# Patient Record
Sex: FEMALE | Race: WHITE | NOT HISPANIC OR LATINO | Employment: FULL TIME | URBAN - METROPOLITAN AREA
[De-identification: names, ages, dates, MRNs, and addresses within clinical notes are randomized per-mention and may not be internally consistent; named-entity substitution may affect disease eponyms.]

---

## 2021-06-28 ENCOUNTER — HOSPITAL ENCOUNTER (EMERGENCY)
Facility: HOSPITAL | Age: 56
Discharge: HOME | End: 2021-06-28
Admitting: EMERGENCY MEDICINE

## 2021-06-28 ENCOUNTER — APPOINTMENT (OUTPATIENT)
Dept: GENERAL RADIOLOGY | Facility: HOSPITAL | Age: 56
End: 2021-06-28

## 2021-06-28 VITALS
WEIGHT: 132.28 LBS | BODY MASS INDEX: 23.44 KG/M2 | DIASTOLIC BLOOD PRESSURE: 91 MMHG | HEART RATE: 68 BPM | HEIGHT: 63 IN | RESPIRATION RATE: 16 BRPM | SYSTOLIC BLOOD PRESSURE: 130 MMHG | OXYGEN SATURATION: 98 % | TEMPERATURE: 98 F

## 2021-06-28 DIAGNOSIS — G56.01 CARPAL TUNNEL SYNDROME OF RIGHT WRIST: ICD-10-CM

## 2021-06-28 DIAGNOSIS — M79.641 PAIN OF RIGHT HAND: Primary | ICD-10-CM

## 2021-06-28 DIAGNOSIS — M25.531 RIGHT WRIST PAIN: ICD-10-CM

## 2021-06-28 PROCEDURE — 73110 X-RAY EXAM OF WRIST: CPT

## 2021-06-28 PROCEDURE — 99283 EMERGENCY DEPT VISIT LOW MDM: CPT

## 2021-06-28 PROCEDURE — 73130 X-RAY EXAM OF HAND: CPT

## 2021-06-28 RX ORDER — HYDROCODONE BITARTRATE AND ACETAMINOPHEN 5; 325 MG/1; MG/1
1 TABLET ORAL EVERY 6 HOURS PRN
Qty: 12 TABLET | Refills: 0 | OUTPATIENT
Start: 2021-06-28 | End: 2022-03-07

## 2021-06-28 RX ORDER — TRAMADOL HYDROCHLORIDE 50 MG/1
50 TABLET ORAL EVERY 6 HOURS PRN
Qty: 12 TABLET | Refills: 0 | Status: SHIPPED | OUTPATIENT
Start: 2021-06-28 | End: 2021-06-28

## 2021-06-28 RX ORDER — HYDROCODONE BITARTRATE AND ACETAMINOPHEN 5; 325 MG/1; MG/1
1 TABLET ORAL ONCE AS NEEDED
Status: COMPLETED | OUTPATIENT
Start: 2021-06-28 | End: 2021-06-28

## 2021-06-28 RX ORDER — METHYLPREDNISOLONE 4 MG/1
TABLET ORAL
Qty: 21 TABLET | Refills: 0 | OUTPATIENT
Start: 2021-06-28 | End: 2022-03-07

## 2021-06-28 RX ADMIN — HYDROCODONE BITARTRATE AND ACETAMINOPHEN 1 TABLET: 5; 325 TABLET ORAL at 10:15

## 2022-03-07 ENCOUNTER — HOSPITAL ENCOUNTER (EMERGENCY)
Facility: HOSPITAL | Age: 57
Discharge: HOME | End: 2022-03-07
Attending: EMERGENCY MEDICINE | Admitting: EMERGENCY MEDICINE

## 2022-03-07 VITALS
HEIGHT: 63 IN | RESPIRATION RATE: 18 BRPM | SYSTOLIC BLOOD PRESSURE: 151 MMHG | BODY MASS INDEX: 24.14 KG/M2 | DIASTOLIC BLOOD PRESSURE: 86 MMHG | TEMPERATURE: 97.4 F | WEIGHT: 136.24 LBS | HEART RATE: 80 BPM | OXYGEN SATURATION: 99 %

## 2022-03-07 DIAGNOSIS — M25.532 BILATERAL WRIST PAIN: Primary | ICD-10-CM

## 2022-03-07 DIAGNOSIS — M25.531 BILATERAL WRIST PAIN: Primary | ICD-10-CM

## 2022-03-07 PROCEDURE — 99282 EMERGENCY DEPT VISIT SF MDM: CPT

## 2022-03-07 RX ORDER — METHYLPREDNISOLONE 4 MG/1
TABLET ORAL
Qty: 21 TABLET | Refills: 0 | Status: SHIPPED | OUTPATIENT
Start: 2022-03-07 | End: 2022-06-02

## 2022-03-07 RX ORDER — MELOXICAM 7.5 MG/1
7.5 TABLET ORAL DAILY
Qty: 21 TABLET | Refills: 0 | Status: SHIPPED | OUTPATIENT
Start: 2022-03-07 | End: 2022-06-02

## 2022-03-07 NOTE — ED PROVIDER NOTES
Subjective   Chief complaint: Bilateral wrist pain      Context: Patient is a 56-year-old female who comes in ambulatory by private vehicle by herself with complaints of bilateral wrist pain and left elbow pain.  She states that she was seen last year for the same complaints and thought she had carpal tunnel but never followed up with anyone.  She states that she does work as a  with repetitive motion.  She is right-hand dominant and states she does have some occasional pain in her right elbow.  She denies any fever or systemic illness.  She has not been using any over-the-counter medications alleviate her symptoms.  She notes occasional weakness and paresthesias depending on range of motion and how much she is working without any other unilateral focal deficits confusion ataxia or lethargy.    Location: Bilateral arms  Duration: Greater than 6 months  Timing: Waxes and wanes  Quality/Severity: Moderate   modifying factors: Worse with range of motion  Associated symptoms:        Additional hx provided by: Self    PCP:    None               Review of Systems   Constitutional: Negative for fever.   HENT: Negative.    Eyes: Negative.    Respiratory: Negative.    Cardiovascular: Negative.    Gastrointestinal: Negative.    Genitourinary: Negative.    Musculoskeletal: Positive for arthralgias.   Skin: Negative.    Allergic/Immunologic: Negative for immunocompromised state.   Neurological: Positive for weakness and numbness.   Hematological: Does not bruise/bleed easily.   Psychiatric/Behavioral: Negative for confusion.       No past medical history on file.    Allergies   Allergen Reactions   • Tramadol Nausea Only       No past surgical history on file.    No family history on file.    Social History     Socioeconomic History   • Marital status: Single           Objective   Physical Exam     Vital signs and triage nurse note reviewed.   Constitutional: Awake, alert; well-developed and well-nourished. No acute  distress is noted.   HEENT: Normocephalic, atraumatic; pupils are PERRL with intact EOM; oropharynx is pink and moist without exudate or erythema.   Neck: Supple, full range of motion without pain;     Cardiovascular: Regular rate and rhythm, normal S1-S2.   Pulmonary: Respiratory effort regular nonlabored,g.   Musculoskeletal: Independent range of motion of all extremities; +3 radial pulse.  There is no swelling or cellulitic changes.  Positive Phalen bilaterally.   Neuro: Alert oriented x3, speech is clear and appropriate, GCS 15   Skin:  Fleshtone warm, dry, intact; no erythematous or petechial rash or lesion       Procedures           ED Course      Labs Reviewed - No data to display  Medications - No data to display  No radiology results for the last day  Prior to Admission medications    Medication Sig Start Date End Date Taking? Authorizing Provider   meloxicam (MOBIC) 7.5 MG tablet Take 1 tablet by mouth Daily. 3/7/22   Kera Simmons APRSHY   methylPREDNISolone (MEDROL) 4 MG dose pack Take as directed on package instructions. 3/7/22   Kera Simmons, LORENZO   HYDROcodone-acetaminophen (NORCO) 5-325 MG per tablet Take 1 tablet by mouth Every 6 (Six) Hours As Needed for Moderate Pain . 6/28/21 3/7/22  Abi Brumfield PA   methylPREDNISolone (MEDROL) 4 MG dose pack Take as directed on package instructions. 6/28/21 3/7/22  Abi Brumfield PA                                                MDM  Number of Diagnoses or Management Options  Bilateral wrist pain  Diagnosis management comments: Chart review: July 2021: Right arm pain likely attributed to carpal tunnel, negative x-rays prescriptions for response Medrol and Norco      Comorbidities: Denies  Differentials: Carpal tunnel cubital tunnel arthritis not all inclusive of differentials considered  Radiology interpretation: Not emergently warranted.  No injury.  Denies any history of chemoradiation osteoporosis IV drug use or chronic steroid  use.  Lab interpretation: No cellulitic changes on physical exam consistent with septic arthritis or systemic infection    Appropriate PPE worn during exam.    i discussed findings with patient who voices understanding of discharge instructions, signs and symptoms requiring return to ED; discharged improved and in stable condition with follow up for re-evaluation.  Patient she has bilateral splints at home, will be discharged home with Medrol and Mobic      Final diagnoses:   Bilateral wrist pain       ED Disposition  ED Disposition     ED Disposition   Discharge    Condition   Stable    Comment   --             KLEINERT KUTZ [facility] - [address] [address]  [phone]  Schedule an appointment as soon as possible for a visit            Medication List      New Prescriptions    meloxicam 7.5 MG tablet  Commonly known as: MOBIC  Take 1 tablet by mouth Daily.        Stop    HYDROcodone-acetaminophen 5-325 MG per tablet  Commonly known as: NORCO           Where to Get Your Medications      These medications were sent to [facility] - [address] [place] [address]    Hours: Mon-Fri 7:00AM-7:00PM Phone: [phone]   · meloxicam 7.5 MG tablet  · methylPREDNISolone 4 MG dose pack          Kera Simmons, APRN  03/07/22 9695

## 2022-03-07 NOTE — EXTERNAL PATIENT INSTRUCTIONS
Patient Education   Table of Contents       Carpal Tunnel Syndrome       Cubital Tunnel Syndrome     To view videos and all your education online visit,   [email or web address]   or scan this QR code with your smartphone.                  Carpal Tunnel Syndrome        Carpal tunnel syndrome is a condition that causes pain, numbness, and weakness in your hand and fingers. The carpal tunnel is a narrow area located on the palm side of your wrist. Repeated wrist motion or certain diseases may cause swelling within the tunnel. This swelling pinches the main nerve in the wrist. The main nerve in the wrist is called the median nerve.     What are the causes?    This condition may be caused by:       Repeated and forceful wrist and hand motions.       Wrist injuries.       Arthritis.       A cyst or tumor in the carpal tunnel.       Fluid buildup during pregnancy.       Use of tools that vibrate.     Sometimes the cause of this condition is not known.   What increases the risk?    The following factors may make you more likely to develop this condition:       Having a job that requires you to repeatedly or forcefully move your wrist or hand or requires you to use tools that vibrate. This may include jobs that involve using computers, working on an assembly line, or working with power tools such as drills or fields.       Being a woman.      Having certain conditions, such as:       Diabetes.       Obesity.       An underactive thyroid (hypothyroidism).       Kidney failure.       Rheumatoid arthritis.     What are the signs or symptoms?    Symptoms of this condition include:       A tingling feeling in your fingers, especially in your thumb, index, and middle fingers.       Tingling or numbness in your hand.       An aching feeling in your entire arm, especially when your wrist and elbow are bent for a long time.       Wrist pain that goes up your arm to your shoulder.       Pain that goes down into your palm or  fingers.       A weak feeling in your hands. You may have trouble grabbing and holding items.     Your symptoms may feel worse during the night.   How is this diagnosed?    This condition is diagnosed with a medical history and physical exam. You may also have tests, including:       Electromyogram (EMG). This test measures electrical signals sent by your nerves into the muscles.       Nerve conduction study. This test measures how well electrical signals pass through your nerves.       Imaging tests, such as X-rays, ultrasound, and MRI. These tests check for possible causes of your condition.     How is this treated?    This condition may be treated with:       Lifestyle changes. It is important to stop or change the activity that caused your condition.       Doing exercise and activities to strengthen and stretch your muscles and tendons (physical therapy).       Making lifestyle changes to help with your condition and learning how to do your daily activities safely (occupational therapy).       Medicines for pain and inflammation. This may include medicine that is injected into your wrist.       A wrist splint or brace.       Surgery.     Follow these instructions at home:   If you have a splint or brace:         Wear the splint or brace as told by your health care provider. Remove it only as told by your health care provider.       Loosen the splint or brace if your fingers tingle, become numb, or turn cold and blue.       Keep the splint or brace clean.      If the splint or brace is not waterproof:      Do not  let it get wet.       Cover it with a watertight covering when you take a bath or shower.     Managing pain, stiffness, and swelling       If directed, put ice on the painful area. To do this:       If you have a removeable splint or brace, remove it as told by your health care provider.       Put ice in a plastic bag.       Place a towel between your skin and the bag or between the splint or brace and  the bag.       Leave the ice on for 20 minutes, 2?3 times a day. Do not  fall asleep with the cold pack on your skin.       Remove the ice if your skin turns bright red. This is very important. If you cannot feel pain, heat, or cold, you have a greater risk of damage to the area.     Move your fingers often to reduce stiffness and swelling.   General instructions         Take over-the-counter and prescription medicines only as told by your health care provider.       Rest your wrist and hand from any activity that may be causing your pain. If your condition is work related, talk with your employer about changes that can be made, such as getting a wrist pad to use while typing.       Do any exercises as told by your health care provider, physical therapist, or occupational therapist.       Keep all follow-up visits. This is important.       Contact a health care provider if:         You have new symptoms.       Your pain is not controlled with medicines.       Your symptoms get worse.     Get help right away if:         You have severe numbness or tingling in your wrist or hand.     Summary         Carpal tunnel syndrome is a condition that causes pain, numbness, and weakness in your hand and fingers.       It is usually caused by repeated wrist motions.       Lifestyle changes and medicines are used to treat carpal tunnel syndrome. Surgery may be recommended.       Follow your health care provider's instructions about wearing a splint, resting from activity, keeping follow-up visits, and calling for help.     This information is not intended to replace advice given to you by your health care provider. Make sure you discuss any questions you have with your health care provider.     Document Released: 12/15/2001Document Revised: 04/29/2021Document Reviewed: 04/29/2021     Elsevier Patient Education ? 2021 [employer]         Cubital Tunnel Syndrome        Cubital tunnel syndrome is a condition that causes pain and  weakness of the forearm and hand. It happens when one of the nerves that runs along the inside of the elbow joint (ulnar nerve) becomes irritated. This condition is usually caused by repeated arm motions that are done during sports or work-related activities.     What are the causes?    This condition may be caused by:      Increased pressure on the ulnar nerve at the elbow, arm, or forearm. This can result from:       Irritation caused by repeated elbow bending.       Poorly healed elbow fractures.       Tumors in the elbow. These are usually noncancerous (benign).       Scar tissue that develops in the elbow after an injury.       Bony growths (spurs) near the ulnar nerve.       Stretching of the nerve due to loose elbow ligaments.       Trauma to the nerve at the elbow.     What increases the risk?    The following factors may make you more likely to develop this condition:       Doing manual labor that requires frequent bending of the elbow.       Playing sports that include repeated or strenuous throwing motions, such as baseball.       Playing contact sports, such as football or lacrosse.       Not warming up properly before activities.       Having diabetes.       Having an underactive thyroid (hypothyroidism).     What are the signs or symptoms?    Symptoms of this condition include:       Clumsiness or weakness of the hand.       Tenderness of the inner elbow.       Aching or soreness of the inner elbow, forearm, or fingers, especially the little finger or the ring finger.       Increased pain when forcing the elbow to bend.       Reduced control when throwing objects.       Tingling, numbness, or a burning feeling inside the forearm or in part of the hand or fingers, especially the little finger or the ring finger.       Sharp pains that shoot from the elbow down to the wrist and hand.       The inability to  or pinch hard.     How is this diagnosed?    This condition is diagnosed based on:       Your  symptoms and medical history. Your health care provider will also ask for details about any injury.       A physical exam.      You may also have tests, including:       Electromyogram (EMG). This test measures electrical signals sent by your nerves into the muscles.       Nerve conduction study. This test measures how well electrical signals pass through your nerves.       Imaging tests, such as X-rays, ultrasound, and MRI. These tests check for possible causes of your condition.     How is this treated?    This condition may be treated by:       Stopping the activities that are causing your symptoms to get worse.       Icing and taking medicines to reduce pain and swelling.       Wearing a splint to prevent your elbow from bending, or wearing an elbow pad where the ulnar nerve is closest to the skin.      Working with a physical therapist in less severe cases. This may help to:       Decrease your symptoms.       Improve the strength and range of motion of your elbow, forearm, and hand.     If these treatments do not help, surgery may be needed.   Follow these instructions at home:   If you have a splint:         Wear the splint as told by your health care provider. Remove it only as told by your health care provider.       Loosen the splint if your fingers tingle, become numb, or turn cold and blue.       Keep the splint clean.      If the splint is not waterproof:      Do not  let it get wet.       Cover it with a watertight covering when you take a bath or shower.     Managing pain, stiffness, and swelling           If directed, put ice on the injured area:       Put ice in a plastic bag.       Place a towel between your skin and the bag.       Leave the ice on for 20 minutes, 2?3 times a day.       Move your fingers often to avoid stiffness and to lessen swelling.       Raise (elevate) the injured area above the level of your heart while you are sitting or lying down.     General instructions         Take  over-the-counter and prescription medicines only as told by your health care provider.       Do any exercise or physical therapy as told by your health care provider.      Do not  drive or use heavy machinery while taking prescription pain medicine.       If you were given an elbow pad, wear it as told by your health care provider.       Keep all follow-up visits as told by your health care provider. This is important.       Contact a health care provider if:         Your symptoms get worse.       Your symptoms do not get better with treatment.       You have new pain.       Your hand on the injured side feels numb or cold.     Summary         Cubital tunnel syndrome is a condition that causes pain and weakness of the forearm and hand.       You are more likely to develop this condition if you do work or play sports that involve repeated arm movements.       This condition is often treated by stopping repetitive activities, applying ice, and using anti-inflammatory medicines.       In rare cases, surgery may be needed.     This information is not intended to replace advice given to you by your health care provider. Make sure you discuss any questions you have with your health care provider.     Document Released: 12/18/2006Document Revised: 05/06/2019Document Reviewed: 05/06/2019     [employer] Patient Education ? 2021 [employer]

## 2022-03-07 NOTE — DISCHARGE INSTRUCTIONS
Medications as directed, follow-up pharmacy precautions and warnings.  Follow-up with the hand and arm specialist.  Wear wrist splints and elbow compression sleeves.  Return for any new or worsening symptoms.

## 2022-06-02 ENCOUNTER — HOSPITAL ENCOUNTER (EMERGENCY)
Facility: HOSPITAL | Age: 57
Discharge: LEFT BEFORE DISCHARGE | End: 2022-06-02
Admitting: EMERGENCY MEDICINE

## 2022-06-02 VITALS
WEIGHT: 121.03 LBS | TEMPERATURE: 97.8 F | HEIGHT: 62 IN | BODY MASS INDEX: 22.27 KG/M2 | SYSTOLIC BLOOD PRESSURE: 171 MMHG | RESPIRATION RATE: 18 BRPM | DIASTOLIC BLOOD PRESSURE: 89 MMHG | HEART RATE: 99 BPM | OXYGEN SATURATION: 98 %

## 2022-06-02 DIAGNOSIS — M79.601 RIGHT ARM PAIN: Primary | ICD-10-CM

## 2022-06-02 PROCEDURE — 99281 EMR DPT VST MAYX REQ PHY/QHP: CPT

## 2022-06-02 NOTE — ED PROVIDER NOTES
Subjective   Chief complaint: Right arm pain      Context: Patient is 56-year-old female who comes in complaining of bilateral hand pain with worsening right upper extremity pain after she has been cleaning her house for the last 2 weeks.  She states she was instructed she likely had carpal tunnel a year ago but has not followed up with a specialist.  She has any new falls or injury.  She describes the pain as a burning sensation that radiates up her arm.  States the symptoms are consistent with her evaluations over a year ago and again a couple months ago related to carpal tunnel or cubital tunnel symptoms.  Heart score low and does not describe any chest pain shortness of breath diaphoresis.    Location: Bilateral wrists and right upper extremity  Duration: Over a year worse since yesterday  Timing: Waxes and wanes  Quality/Severity: Moderate to severe  Modifying factors: Worse with range of motion  Associated symptoms: Little relief with Lidoderm patches        Additional hx provided by: Self    PCP:  None               Review of Systems   Constitutional: Negative for fever.   Respiratory: Negative for shortness of breath.    Cardiovascular: Negative.    Gastrointestinal: Negative.    Musculoskeletal: Positive for arthralgias and myalgias.   Skin: Negative.    Allergic/Immunologic: Negative for immunocompromised state.   Hematological: Does not bruise/bleed easily.   Psychiatric/Behavioral: Negative for confusion.       PMH likely carpal tunnel syndrome    Allergies   Allergen Reactions   • Tramadol Nausea Only       No past surgical history on file.    No family history on file.    Social History     Socioeconomic History   • Marital status: Single           Objective   Physical Exam     Vital signs and traige nurse note reviewed.  Constitutional:  Awake, alert; well developed and well nourished.  Uncomfortable  HEENT:  Normocephalic, atraumatic;  with intact EOM; oropharynx is pink and moist without edema or  "erythema.  Neck: Supple, full range of motion without pain;   Cardiovascular: Regular rate and rhythm,    Pulmonary: Respiratory effort regular, nonlabored;   Musculoskeletal: No midline tenderness down the CT spine.  Refused to do Phalen evaluation.  Notes pain along the ulnar nerve distribution as well as carpal distribution with pain with range of motion and gripping.  No obvious signs of trauma.  No erythema induration fluctuation or cellulitic changes noted  Neuro: Alert oriented x3, speech is clear and appropriate.  Physical exam and HPI was done by Edwin VENTURA student    Procedures           ED Course  ED Course as of 06/02/22 0939   Thu Jun 02, 2022   0915 Seen after being placed in a room from triage [JW]   0930 Nursing staff reports patient has left prior to receiving her discharge paperwork because she states she will follow-up with a specialist [JW]      ED Course User Index  [JW] Kera Simmons APRN           Labs Reviewed - No data to display  Medications - No data to display  No radiology results for the last day  Prior to Admission medications    Medication Sig Start Date End Date Taking? Authorizing Provider   meloxicam (MOBIC) 7.5 MG tablet Take 1 tablet by mouth Daily. 3/7/22   Kera Simmons APRN   methylPREDNISolone (MEDROL) 4 MG dose pack Take as directed on package instructions. 3/7/22   Kera Simmons APRN                                           [facility]  Number of Diagnoses or Management Options  Right arm pain  Diagnosis management comments: Chart review: Patient was seen in June 2021 and thought to have carpal tunnel  ER evaluation March 2022 DC'd with anti-inflammatories and NSAIDs and again referred to Ortho for carpal tunnel evaluation    Inspect report: Patient had Norco filled in June 2021    /89   Pulse 99   Temp 97.8 °F (36.6 °C) (Oral)   Resp 18   Ht 157.5 cm (62\")   Wt 54.9 kg (121 lb 0.5 oz)   SpO2 98%   BMI 22.14 kg/m²          Comorbidities: Carpal " "tunnel  Differentials: Carpal tunnel cubital tunnel not all inclusive of differentials considered  Radiology interpretation: Not felt to be emergently warranted  Lab interpretation:  L not felt to be emergently warranted    Appropriate PPE worn during exam.  Patient states she has not yet followed up with specialist regarding her ongoing and intermittently worsening likely carpal/cubital tunnel syndrome symptoms \"because I haven't had time.\"  She was offered analgesia by nursing staff prior to leaving before her discharge paperwork and prescriptions can be given.  Nursing staff states she walked out stating she will \"follow-up with specialist and does not need to be here.\"      This document is intended for medical expert use only. Reading of this document by patients and/or patient's family without participating medical staff guidance may result in misinterpretation and unintended morbidity.  Any interpretation of such data is the responsibility of the patient and/or family member responsible for the patient in concert with their primary or specialist providers, not to be left for sources of online searches such as [employer], [employer] or similar queries. Relying on these approaches to knowledge may result in misinterpretation, misguided goals of care and even death should patients or family members try recommendations outside of the realm of professional medical care in a supervised inpatient environment.         Final diagnoses:   Right arm pain       ED Disposition  ED Disposition     ED Disposition   Discharge    Condition   Stable    Comment   --             No follow-up provider specified.       Medication List      No changes were made to your prescriptions during this visit.          Kera Simmons, APRN  06/02/22 0940    "

## 2022-11-11 ENCOUNTER — HOSPITAL ENCOUNTER (EMERGENCY)
Facility: HOSPITAL | Age: 57
Discharge: HOME | End: 2022-11-11
Admitting: EMERGENCY MEDICINE

## 2022-11-11 ENCOUNTER — APPOINTMENT (OUTPATIENT)
Dept: GENERAL RADIOLOGY | Facility: HOSPITAL | Age: 57
End: 2022-11-11

## 2022-11-11 VITALS
HEART RATE: 97 BPM | SYSTOLIC BLOOD PRESSURE: 138 MMHG | OXYGEN SATURATION: 98 % | RESPIRATION RATE: 18 BRPM | HEIGHT: 63 IN | DIASTOLIC BLOOD PRESSURE: 84 MMHG | WEIGHT: 128 LBS | BODY MASS INDEX: 22.68 KG/M2 | TEMPERATURE: 98 F

## 2022-11-11 DIAGNOSIS — M54.42 ACUTE MIDLINE LOW BACK PAIN WITH LEFT-SIDED SCIATICA: Primary | ICD-10-CM

## 2022-11-11 LAB
BACTERIA UR QL AUTO: ABNORMAL /HPF
BILIRUB UR QL STRIP: NEGATIVE
CLARITY UR: CLEAR
COLOR UR: YELLOW
GLUCOSE UR STRIP-MCNC: NEGATIVE MG/DL
HGB UR QL STRIP.AUTO: ABNORMAL
HYALINE CASTS UR QL AUTO: ABNORMAL /LPF
KETONES UR QL STRIP: ABNORMAL
LEUKOCYTE ESTERASE UR QL STRIP.AUTO: NEGATIVE
NITRITE UR QL STRIP: NEGATIVE
PH UR STRIP.AUTO: <=5 [PH] (ref 5–8)
PROT UR QL STRIP: NEGATIVE
RBC # UR STRIP: ABNORMAL /HPF
REF LAB TEST METHOD: ABNORMAL
SP GR UR STRIP: 1.03 (ref 1–1.03)
SQUAMOUS #/AREA URNS HPF: ABNORMAL /HPF
UROBILINOGEN UR QL STRIP: ABNORMAL
WBC # UR STRIP: ABNORMAL /HPF

## 2022-11-11 PROCEDURE — 63710000001 ONDANSETRON ODT 4 MG TABLET DISPERSIBLE: Performed by: NURSE PRACTITIONER

## 2022-11-11 PROCEDURE — 72110 X-RAY EXAM L-2 SPINE 4/>VWS: CPT

## 2022-11-11 PROCEDURE — 81001 URINALYSIS AUTO W/SCOPE: CPT | Performed by: NURSE PRACTITIONER

## 2022-11-11 PROCEDURE — 99283 EMERGENCY DEPT VISIT LOW MDM: CPT

## 2022-11-11 PROCEDURE — 63710000001 PREDNISONE PER 5 MG: Performed by: NURSE PRACTITIONER

## 2022-11-11 RX ORDER — HYDROCODONE BITARTRATE AND ACETAMINOPHEN 5; 325 MG/1; MG/1
1 TABLET ORAL ONCE
Status: COMPLETED | OUTPATIENT
Start: 2022-11-11 | End: 2022-11-11

## 2022-11-11 RX ORDER — METHOCARBAMOL 500 MG/1
500 TABLET, FILM COATED ORAL 4 TIMES DAILY PRN
Qty: 20 TABLET | Refills: 0 | Status: SHIPPED | OUTPATIENT
Start: 2022-11-11

## 2022-11-11 RX ORDER — PREDNISONE 20 MG/1
20 TABLET ORAL DAILY
Qty: 5 TABLET | Refills: 0 | Status: SHIPPED | OUTPATIENT
Start: 2022-11-11

## 2022-11-11 RX ORDER — METHOCARBAMOL 500 MG/1
500 TABLET, FILM COATED ORAL ONCE
Status: COMPLETED | OUTPATIENT
Start: 2022-11-11 | End: 2022-11-11

## 2022-11-11 RX ORDER — ONDANSETRON 4 MG/1
4 TABLET, ORALLY DISINTEGRATING ORAL ONCE
Status: COMPLETED | OUTPATIENT
Start: 2022-11-11 | End: 2022-11-11

## 2022-11-11 RX ORDER — DICLOFENAC SODIUM 75 MG/1
75 TABLET, DELAYED RELEASE ORAL 2 TIMES DAILY PRN
Qty: 20 TABLET | Refills: 0 | Status: SHIPPED | OUTPATIENT
Start: 2022-11-11

## 2022-11-11 RX ADMIN — ONDANSETRON 4 MG: 4 TABLET, ORALLY DISINTEGRATING ORAL at 14:52

## 2022-11-11 RX ADMIN — PREDNISONE 60 MG: 10 TABLET ORAL at 14:52

## 2022-11-11 RX ADMIN — METHOCARBAMOL TABLETS 500 MG: 500 TABLET, COATED ORAL at 14:52

## 2022-11-11 RX ADMIN — HYDROCODONE BITARTRATE AND ACETAMINOPHEN 1 TABLET: 5; 325 TABLET ORAL at 14:52

## 2022-11-11 NOTE — DISCHARGE INSTRUCTIONS
Warm compresses and static stretches to the sore area.    Follow-up with neurosurgery or primary care if symptoms are persistent greater than 10 days    Use medication as prescribed do not mix with Motrin ibuprofen Advil or Aleve    Return for increased pain nausea vomiting fever chills loss of bowel or bladder control

## 2022-11-11 NOTE — ED PROVIDER NOTES
Subjective   History of Present Illness  Patient is a 57-year-old female who complains of low back pain with no known injury.  She states she woke up yesterday and the pain got worse today which brought her to the emergency room.  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  She has had no numbness no tingling no saddle anesthesia.  No loss of bowel or bladder control she denies any urinary symptoms she denies fever chills-she rates her pain a 7/10.  She states that it radiates down her left leg she states its worse with ambulation better when she lies flat-        Review of Systems   Constitutional: Negative for chills, fatigue and fever.   HENT: Negative for congestion, tinnitus and trouble swallowing.    Eyes: Negative for photophobia, discharge and redness.   Respiratory: Negative for cough and shortness of breath.    Cardiovascular: Negative for chest pain and palpitations.   Gastrointestinal: Negative for abdominal pain, diarrhea, nausea and vomiting.   Genitourinary: Negative for dysuria, frequency and urgency.   Musculoskeletal: Positive for back pain. Negative for joint swelling and myalgias.   Skin: Negative for rash.   Neurological: Negative for dizziness and headaches.   Psychiatric/Behavioral: Negative for confusion.   All other systems reviewed and are negative.      History reviewed. No pertinent past medical history.    Allergies   Allergen Reactions   • Tramadol Nausea Only       History reviewed. No pertinent surgical history.    History reviewed. No pertinent family history.    Social History     Socioeconomic History   • Marital status: Single           Objective   Physical Exam  Vitals reviewed.   Constitutional:       General: She is not in acute distress.     Appearance: Normal appearance. She is well-developed and normal weight. She is not toxic-appearing.   HENT:      Head: Normocephalic and atraumatic.   Eyes:  "     Conjunctiva/sclera: Conjunctivae normal.      Pupils: Pupils are equal, round, and reactive to light.   Cardiovascular:      Rate and Rhythm: Normal rate and regular rhythm.      Heart sounds: Normal heart sounds.   Pulmonary:      Effort: Pulmonary effort is normal. No respiratory distress.      Breath sounds: Normal breath sounds. No wheezing.   Abdominal:      General: Bowel sounds are normal. There is no distension.      Palpations: Abdomen is soft. There is no mass.      Tenderness: There is no abdominal tenderness. There is no guarding or rebound.   Musculoskeletal:         General: No deformity.      Cervical back: Normal, normal range of motion and neck supple.      Thoracic back: Normal.      Lumbar back: Spasms and tenderness present. No swelling. Decreased range of motion.        Back:       Comments: There is a negative straight leg test bilaterally.  Upper and lower strength 5/5   Skin:     General: Skin is warm and dry.      Capillary Refill: Capillary refill takes less than 2 seconds.   Neurological:      General: No focal deficit present.      Mental Status: She is alert and oriented to person, place, and time.      GCS: GCS eye subscore is 4. GCS verbal subscore is 5. GCS motor subscore is 6.      Cranial Nerves: No cranial nerve deficit.      Sensory: No sensory deficit.      Deep Tendon Reflexes: Reflexes normal.   Psychiatric:         Mood and Affect: Mood normal.         Behavior: Behavior normal.         Procedures           ED Course  ED Course as of 11/11/22 1531   Fri Nov 11, 2022   1514 Waiting for xray to be read   [KW]      ED Course User Index  [KW] Brooke Simmons, LORENZO      /84 (BP Location: Right arm, Patient Position: Sitting)   Pulse 97   Temp 98 °F (36.7 °C) (Oral)   Resp 18   Ht 160 cm (63\")   Wt 58.1 kg (128 lb)   SpO2 98%   BMI 22.67 kg/m²   Labs Reviewed   URINALYSIS W/ CULTURE IF INDICATED - Abnormal; Notable for the following components:       Result " Value    Ketones, UA Trace (*)     Blood, UA Small (1+) (*)     All other components within normal limits    Narrative:     In absence of clinical symptoms, the presence of pyuria, bacteria, and/or nitrites on the urinalysis result does not correlate with infection.   URINALYSIS, MICROSCOPIC ONLY - Abnormal; Notable for the following components:    RBC, UA 6-12 (*)     WBC, UA 0-2 (*)     All other components within normal limits     Medications   methocarbamol (ROBAXIN) tablet 500 mg (500 mg Oral Given 11/11/22 1452)   predniSONE (DELTASONE) tablet 60 mg (60 mg Oral Given 11/11/22 1452)   HYDROcodone-acetaminophen (NORCO) 5-325 MG per tablet 1 tablet (1 tablet Oral Given 11/11/22 1452)   ondansetron ODT (ZOFRAN-ODT) disintegrating tablet 4 mg (4 mg Oral Given 11/11/22 1452)     XR Spine Lumbar Complete 4+VW    Result Date: 11/11/2022   1. Mild degenerative changes of the lumbar spine. Degenerative changes of the sacroiliac joints. 2. No acute lumbar spine findings.  Electronically Signed By-Myrtle Flynn MD On:11/11/2022 3:24 PM This report was finalized on [id number] by  Myrtle Flynn MD.                                         MDM  Number of Diagnoses or Management Options  Acute midline low back pain with left-sided sciatica  Diagnosis management comments: Patient had above exam and x-rays were obtained and reviewed and discussed with the patient to have no acute findings.  The patient was treated with Robaxin prednisone and hydrocodone.  The patient had improvement of symptoms at discharge.  She was advised to follow-up with primary care for any further problems or return to the ER for she verbalized understood discharge       Amount and/or Complexity of Data Reviewed  Clinical lab tests: reviewed  Tests in the radiology section of CPT®: reviewed    Risk of Complications, Morbidity, and/or Mortality  Presenting problems: high  Diagnostic procedures: high  Management options: high    Patient  Progress  Patient progress: improved      Final diagnoses:   Acute midline low back pain with left-sided sciatica       ED Disposition  ED Disposition     ED Disposition   Discharge    Condition   Stable    Comment   --             Elsy Gonzales MD  [address] [facility] [place] [address] [phone]    Schedule an appointment as soon as possible for a visit in 5 days      Tayo Bustillos IV, MD  [address] [place] [address] [phone]    In 3 days  as needed for further evaluation of spine, If symptoms worsen, As needed         Medication List      New Prescriptions    diclofenac 75 MG EC tablet  Commonly known as: VOLTAREN  Take 1 tablet by mouth 2 (Two) Times a Day As Needed (pain).     methocarbamol 500 MG tablet  Commonly known as: ROBAXIN  Take 1 tablet by mouth 4 (Four) Times a Day As Needed for Muscle Spasms.     predniSONE 20 MG tablet  Commonly known as: DELTASONE  Take 1 tablet by mouth Daily.           Where to Get Your Medications      These medications were sent to [address] [place] [address]    Hours: Mon-Fri 7:00AM-7:00PM Phone: [phone]   · diclofenac 75 MG EC tablet  · methocarbamol 500 MG tablet  · predniSONE 20 MG tablet          Brooke Simmons, APRN  11/11/22 8392

## 2022-11-11 NOTE — ED NOTES
Pt c/o back pain radiating into left leg.Denies any other symptoms. No obvious deformity noted. Pt denies any trauma.

## 2022-11-18 NOTE — PROGRESS NOTES
EMG and Nerve Conduction Studies    The complete report includes the data sheets.     Date of Study: 11/23/22    Referring Provider:DR. MONSON      History: BUE-CTS    Results:    Prior to starting the procedure, the patient's identity was verified, pertinent available records were reviewed, the nature of the procedure was explained, the appropriate site of the exam were confirmed directly with the patient, and a pre-procedure pause was performed for final verification of all the above.    1.  The right median sensory study was attempted no response was recorded.  The right median motor distal latency was prolonged and the amplitude of the compound muscle action potential was markedly reduced.  The forearm conduction velocity was normal.    2.  The left median sensory and motor distal latencies were prolonged.  The left median motor forearm conduction velocity is normal.    3.  The ulnar sensory and motor nerve conduction studies were normal bilaterally.    4.  EMG of the muscles examined in the bilateral C5-T1 myotomes were normal except for neurogenic changes in the abductor pollicis brevis muscle greater on the right than the left.    Impression:    This is an abnormal study.  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  There is no evidence of focal ulnar neuropathy at the elbow or neurogenic change in the muscles examined in the C5-T1 myotome bilaterally except for the median innervated abductor pollicis brevis muscle      Electronically signed by :    Joseph Seipel, M.D.  November 23, 2022

## 2022-11-23 ENCOUNTER — PROCEDURE VISIT (OUTPATIENT)
Dept: NEUROLOGY | Facility: CLINIC | Age: 57
End: 2022-11-23

## 2022-11-23 VITALS — TEMPERATURE: 98.2 F | HEIGHT: 63 IN | BODY MASS INDEX: 22.67 KG/M2

## 2022-11-23 DIAGNOSIS — G56.03 CARPAL TUNNEL SYNDROME, BILATERAL: Primary | ICD-10-CM

## 2022-11-23 PROCEDURE — 95910 NRV CNDJ TEST 7-8 STUDIES: CPT | Performed by: PSYCHIATRY & NEUROLOGY

## 2022-11-23 PROCEDURE — 95886 MUSC TEST DONE W/N TEST COMP: CPT | Performed by: PSYCHIATRY & NEUROLOGY

## 2023-09-19 ENCOUNTER — HOSPITAL ENCOUNTER (OUTPATIENT)
Facility: HOSPITAL | Age: 58
Setting detail: HOSPITAL OUTPATIENT SURGERY
End: 2023-09-19
Attending: ORTHOPAEDIC SURGERY | Admitting: ORTHOPAEDIC SURGERY
Payer: COMMERCIAL

## 2023-09-21 ENCOUNTER — LAB (OUTPATIENT)
Dept: LAB | Facility: HOSPITAL | Age: 58
End: 2023-09-21

## 2023-09-21 ENCOUNTER — PRE-ADMISSION TESTING (OUTPATIENT)
Dept: PREADMISSION TESTING | Facility: HOSPITAL | Age: 58
End: 2023-09-21
Payer: COMMERCIAL

## 2023-09-21 ENCOUNTER — HOSPITAL ENCOUNTER (OUTPATIENT)
Dept: CARDIOLOGY | Facility: HOSPITAL | Age: 58
Discharge: HOME | End: 2023-09-21
Payer: COMMERCIAL

## 2023-09-21 VITALS
DIASTOLIC BLOOD PRESSURE: 82 MMHG | WEIGHT: 108 LBS | OXYGEN SATURATION: 98 % | RESPIRATION RATE: 18 BRPM | SYSTOLIC BLOOD PRESSURE: 139 MMHG | BODY MASS INDEX: 19.88 KG/M2 | HEART RATE: 95 BPM | TEMPERATURE: 97.9 F | HEIGHT: 62 IN

## 2023-09-21 LAB
ABO GROUP BLD: NORMAL
ANION GAP SERPL CALCULATED.3IONS-SCNC: 8.2 MMOL/L (ref 5–15)
BLD GP AB SCN SERPL QL: NEGATIVE
BUN SERPL-MCNC: 16 MG/DL (ref 6–20)
BUN/CREAT SERPL: 21.1 (ref 7–25)
CALCIUM SPEC-SCNC: 9.5 MG/DL (ref 8.6–10.5)
CHLORIDE SERPL-SCNC: 102 MMOL/L (ref 98–107)
CO2 SERPL-SCNC: 29.8 MMOL/L (ref 22–29)
CREAT SERPL-MCNC: 0.76 MG/DL (ref 0.57–1)
DEPRECATED RDW RBC AUTO: 39 FL (ref 37–54)
EGFRCR SERPLBLD CKD-EPI 2021: 92.1 ML/MIN/1.73
ERYTHROCYTE [DISTWIDTH] IN BLOOD BY AUTOMATED COUNT: 12.5 % (ref 12.3–15.4)
GLUCOSE SERPL-MCNC: 118 MG/DL (ref 65–99)
HCT VFR BLD AUTO: 41 % (ref 34–46.6)
HGB BLD-MCNC: 13.7 G/DL (ref 12–15.9)
MCH RBC QN AUTO: 28.7 PG (ref 26.6–33)
MCHC RBC AUTO-ENTMCNC: 33.4 G/DL (ref 31.5–35.7)
MCV RBC AUTO: 85.8 FL (ref 79–97)
MRSA DNA SPEC QL NAA+PROBE: NORMAL
PLATELET # BLD AUTO: 335 10*3/MM3 (ref 140–450)
PMV BLD AUTO: 9.5 FL (ref 6–12)
POTASSIUM SERPL-SCNC: 4.5 MMOL/L (ref 3.5–5.2)
QT INTERVAL: 356 MS
QTC INTERVAL: 451 MS
RBC # BLD AUTO: 4.78 10*6/MM3 (ref 3.77–5.28)
RH BLD: POSITIVE
SODIUM SERPL-SCNC: 140 MMOL/L (ref 136–145)
T&S EXPIRATION DATE: NORMAL
WBC NRBC COR # BLD: 10 10*3/MM3 (ref 3.4–10.8)

## 2023-09-21 PROCEDURE — 85027 COMPLETE CBC AUTOMATED: CPT

## 2023-09-21 PROCEDURE — 87641 MR-STAPH DNA AMP PROBE: CPT

## 2023-09-21 PROCEDURE — 80048 BASIC METABOLIC PNL TOTAL CA: CPT

## 2023-09-21 PROCEDURE — 86850 RBC ANTIBODY SCREEN: CPT

## 2023-09-21 PROCEDURE — 86901 BLOOD TYPING SEROLOGIC RH(D): CPT

## 2023-09-21 PROCEDURE — 97161 PT EVAL LOW COMPLEX 20 MIN: CPT

## 2023-09-21 PROCEDURE — 93005 ELECTROCARDIOGRAM TRACING: CPT | Performed by: ORTHOPAEDIC SURGERY

## 2023-09-21 PROCEDURE — 86900 BLOOD TYPING SEROLOGIC ABO: CPT

## 2023-09-21 RX ORDER — BUSPIRONE HYDROCHLORIDE 10 MG/1
10 TABLET ORAL 2 TIMES DAILY
COMMUNITY

## 2023-09-21 RX ORDER — OXYCODONE HCL 10 MG/1
10 TABLET, FILM COATED, EXTENDED RELEASE ORAL EVERY 12 HOURS PRN
COMMUNITY

## 2023-09-21 NOTE — THERAPY EVALUATION
Patient Name: Nanci Nunez  : 1965    MRN: [id number]                              Today's Date: 2023       Admit Date: (Not on file)    Visit Dx: No diagnosis found.  There is no problem list on file for this patient.    Past Medical History:   Diagnosis Date    Anxiety     Arthritis     Hypertension     Low back pain     Neuropathy     left foot    Pain in right hip      Past Surgical History:   Procedure Laterality Date    CARPAL TUNNEL RELEASE Bilateral     2023    TRIGGER FINGER RELEASE Bilateral     2023      General Information       Row Name 23 1334          Physical Therapy Time and Intention    Document Type evaluation  -CR     Mode of Treatment physical therapy  -CR       Row Name 23 1334          General Information    Patient Profile Reviewed yes  -CR     Prior Level of Function independent:;all household mobility;community mobility;driving;work  works in vet clinic  -CR     Existing Precautions/Restrictions no known precautions/restrictions  -CR     Barriers to Rehab none identified  -CR       Row Name 23 1334          Living Environment    People in Home parent(s)  -CR       Row Name 23 1334          Home Main Entrance    Number of Stairs, Main Entrance one  -CR       Row Name 23 1334          Stairs Within Home, Primary    Stairs, Within Home, Primary 1 flight to upstairs bedroom  -CR       Row Name 23 1334          Cognition    Orientation Status (Cognition) oriented x 4  -CR       Row Name 23 1331          Safety Issues, Functional Mobility    Impairments Affecting Function (Mobility) pain  -CR               User Key  (r) = Recorded By, (t) = Taken By, (c) = Cosigned By      Initials Name Provider Type    CR Reyes, Carmela, PT Physical Therapist                   Mobility       Row Name 23 1331          Sit-Stand Transfer    Sit-Stand [place] (Transfers) modified independence  -CR       Row Name 23 1330           Gait/Stairs (Locomotion)    [place] Level (Gait) independent  -CR               User Key  (r) = Recorded By, (t) = Taken By, (c) = Cosigned By      Initials Name Provider Type    CR Reyes, Carmela, PT Physical Therapist                   Obj/Interventions       Row Name 09/21/23 1336          Range of Motion Comprehensive    General Range of Motion no range of motion deficits identified  -CR     Comment, General Range of Motion reports of pain ROM R hip  -CR       Row Name 09/21/23 1336          Strength Comprehensive (MMT)    General Manual Muscle Testing (MMT) Assessment no strength deficits identified  -CR       Row Name 09/21/23 1336          Balance    Balance Assessment sit to stand dynamic balance;standing static balance;standing dynamic balance  -CR     Sit to Stand Dynamic Balance modified independence  -CR     Dynamic Standing Balance independent  -CR       Row Name 09/21/23 1336          Sensory Assessment (Somatosensory)    Sensory Assessment (Somatosensory) sensation intact  -CR               User Key  (r) = Recorded By, (t) = Taken By, (c) = Cosigned By      Initials Name Provider Type    CR Reyes, Carmela, PT Physical Therapist                   Goals/Plan       Row Name 09/21/23 1340          Gait Training Goal 1 (PT)    Activity/Assistive Device (Gait Training Goal 1, PT) gait (walking locomotion);assistive device use;diminish gait deviation;increase endurance/gait distance;walker, rolling  -CR     [place] Level (Gait Training Goal 1, PT) standby assist  -CR     Distance (Gait Training Goal 1, PT) 100  -CR     Time Frame (Gait Training Goal 1, PT) long term goal (LTG);1 week  -CR       Row Name 09/21/23 1340          Stairs Goal 1 (PT)    Activity/Assistive Device (Stairs Goal 1, PT) stairs, all skills  -CR     [place] Level/Cues Needed (Stairs Goal 1, PT) standby assist  -CR     Number of Stairs (Stairs Goal 1, PT) 1 lfight  -CR     Time Frame (Stairs Goal 1, PT) long term goal  (LTG);1 week  -CR       Row Name 09/21/23 1340          Patient Education Goal (PT)    Activity (Patient Education Goal, PT) HEP  -CR     [place]/Cues/Accuracy (Memory Goal 2, PT) demonstrates adequately  -CR     Time Frame (Patient Education Goal, PT) long term goal (LTG);1 week  -CR       Row Name 09/21/23 1340          Therapy Assessment/Plan (PT)    Planned Therapy Interventions (PT) gait training;home exercise program;patient/family education;stair training  -CR               User Key  (r) = Recorded By, (t) = Taken By, (c) = Cosigned By      Initials Name Provider Type    CR Reyes, Carmela, PT Physical Therapist                   Clinical Impression       Row Name 09/21/23 1337          Pain    Pretreatment Pain Rating 3/10  -CR     Posttreatment Pain Rating 3/10  -CR     Pain Location - Side/Orientation Right  -CR     Pain Location - hip  -CR       Row Name 09/21/23 1331          Plan of Care Review    Plan of Care Reviewed With patient;friend  -CR     Outcome Evaluation 56 y/o female with OA R hip scheduled for ant R THR on 9/25/2023. Pmh includes tobacco use. Patient lives with father in 2 story house with 1 step entry; her bedroom is upstairs. Patient works in a vet clinic. Currently presents with no ROM limitation despite pain throughout range on RLE. Patient ambulating without a limp and reciprocal gait. Educated on HEP, use of a.d. icing and plan of care following surgery. Patient should do well with HH or OP PT follow up, will likely need a rw.  -CR       Row Name 09/21/23 5787          Therapy Assessment/Plan (PT)    Patient/Family Therapy Goals Statement (PT) be without pain  -CR     Rehab Potential (PT) good, to achieve stated therapy goals  -CR     Criteria for Skilled Interventions Met (PT) yes;skilled treatment is necessary  -CR     Therapy Frequency (PT) daily  -CR     Predicted Duration of Therapy Intervention (PT) dc  -CR               User Key  (r) = Recorded By, (t) = Taken By, (c) =  Cosigned By      Initials Name Provider Type    CR Reyes, Carmela, PT Physical Therapist                   Outcome Measures       Row Name 09/21/23 1341          How much help from another person do you currently need...    Moving to and from a bed to a chair (including a wheelchair)? 4  -CR               User Key  (r) = Recorded By, (t) = Taken By, (c) = Cosigned By      Initials Name Provider Type    CR Reyes, Carmela, PT Physical Therapist                                 Physical Therapy Education       Title: PT OT SLP Therapies (In Progress)       Topic: Physical Therapy (In Progress)       Point: Mobility training (Not Started)       Learner Progress:  Not documented in this visit.              Point: Home exercise program (Done)       Learning Progress Summary             Patient Acceptance, E,D,H, VU by CR at 9/21/2023 1341                                         User Key       Initials Effective Dates Name Provider Type Discipline    CR 06/16/21 -  Reyes, Carmela, PT Physical Therapist PT                  PT Recommendation and Plan  Planned Therapy Interventions (PT): gait training, home exercise program, patient/family education, stair training  Plan of Care Reviewed With: patient, friend  Outcome Evaluation: 58 y/o female with OA R hip scheduled for ant R THR on 9/25/2023. Pmh includes tobacco use. Patient lives with father in 2 story house with 1 step entry; her bedroom is upstairs. Patient works in a vet clinic. Currently presents with no ROM limitation despite pain throughout range on RLE. Patient ambulating without a limp and reciprocal gait. Educated on HEP, use of a.d. icing and plan of care following surgery. Patient should do well with HH or OP PT follow up, will likely need a rw.     Time Calculation:         PT Charges       Row Name 09/21/23 1342             Time Calculation    Start Time 1130  -CR      Stop Time 1155  -CR      Time Calculation (min) 25 min  -CR      PT Received On 09/21/23   -CR      PT - Next Appointment 09/25/23  -CR         Time Calculation- PT    Total Timed Code Minutes- PT 0 minute(s)  -CR                User Key  (r) = Recorded By, (t) = Taken By, (c) = Cosigned By      Initials Name Provider Type    CR Reyes, Carmela, PT Physical Therapist                  Therapy Charges for Today       Code Description Service Date Service Provider Modifiers Qty    07986732528 HC PT EVAL LOW COMPLEXITY 4 9/21/2023 Reyes, Carmela, PT GP 1               PT Discharge Summary  Anticipated Discharge Disposition (PT): home with home health, home with outpatient therapy services    Carmela Reyes, PT  9/21/2023

## 2023-09-21 NOTE — PLAN OF CARE
Goal Outcome Evaluation:  Plan of Care Reviewed With: patient, friend           Outcome Evaluation: 56 y/o female with OA R hip scheduled for ant R THR on 9/25/2023. Pmh includes tobacco use. Patient lives with father in 2 story house with 1 step entry; her bedroom is upstairs. 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 Currently presents with no ROM limitation despite pain throughout range on RLE. Patient ambulating without a limp and reciprocal gait. Educated on HEP, use of a.d. icing and plan of care following surgery. Patient should do well with HH or OP PT follow up, will likely need a rw.      Anticipated Discharge Disposition (PT): home with home health, home with outpatient therapy services

## 2023-11-15 ENCOUNTER — PATIENT ROUNDING (BHMG ONLY) (OUTPATIENT)
Dept: CARDIOLOGY | Facility: CLINIC | Age: 58
End: 2023-11-15
Payer: MEDICAID

## 2023-11-15 ENCOUNTER — OFFICE VISIT (OUTPATIENT)
Dept: CARDIOLOGY | Facility: CLINIC | Age: 58
End: 2023-11-15
Payer: MEDICAID

## 2023-11-15 VITALS
HEIGHT: 62 IN | BODY MASS INDEX: 20.61 KG/M2 | WEIGHT: 112 LBS | RESPIRATION RATE: 18 BRPM | SYSTOLIC BLOOD PRESSURE: 139 MMHG | DIASTOLIC BLOOD PRESSURE: 93 MMHG | HEART RATE: 102 BPM

## 2023-11-15 DIAGNOSIS — R94.31 ABNORMAL EKG: Primary | ICD-10-CM

## 2023-11-15 RX ORDER — METOPROLOL SUCCINATE 25 MG/1
25 TABLET, EXTENDED RELEASE ORAL DAILY
Qty: 30 TABLET | Refills: 11 | Status: SHIPPED | OUTPATIENT
Start: 2023-11-15

## 2023-11-15 NOTE — PROGRESS NOTES
A My-Chart message has been sent to the patient for PATIENT ROUNDING with [facility].

## 2023-11-15 NOTE — PROGRESS NOTES
"Cardiology Clinic Note  Esteban Francisco MD, PhD    Subjective:     Encounter Date:11/15/2023      Patient ID: Nanci Nunez is a 58 y.o. female.    Chief Complaint:  Chief Complaint   Patient presents with    Abnormal ECG       HPI:    I the pleasure to see the 58-year-old female as a new patient today for perioperative work-up with significant risk factors of essential hypertension, mixed hyperlipidemia, tobacco abuse.  She has an abnormal EKG sinus rhythm with nonspecific ST-T wave abnormalities and repolarization abnormalities concerning for possible LVH.  She has chronic shortness of breath dyspnea exertion with chronic smoking history and has cough with likely some degree of smoking-related lung disease.  No anginal chest pain per se.  She is here for perioperative evaluation with abnormal EKG dyspnea on exertion and cardiac risk factors.  We discussed stress and echo which she is agreeable for for preoperative evaluation as well as evaluation for overall CV health.  She is pending labs at this time for lipid assessment and ASCVD risk calculation.    Review of systems otherwise negative x14 point review of systems except as mentioned above    Historical data copied forward from previous encounters in EMR including the history, exam, and assessment/plan has been reviewed and is unchanged unless noted otherwise.    Cardiac medicines reviewed with risk, benefits, and necessity of each discussed.    Risk and benefit of cardiac testing reviewed including death heart attack stroke pain bleeding infection need for vascular /cardiovascular surgery were discussed and the patient     Objective:         /93 (BP Location: Right arm, Patient Position: Sitting)   Pulse 102   Resp 18   Ht 157.5 cm (62\")   Wt 50.8 kg (112 lb)   BMI 20.49 kg/m²     Physical Exam  Regular rate and rhythm 90s to 100s, no rubs gallops heave or lift  Mild expiratory wheeze with delayed expiratory phase  No clubbing cyanosis or " edema  Skin is warm and dry  Intact grossly  Thin soft nontender  No bony abnormalities grossly    Assessment:       Need for hip replacement  Osteoporosis  Tobacco abuse  Essential hypertension  Mixed hyperlipidemia  Risk factors for coronary disease  Abnormal EKG    Treadmill stress with nuclear imaging for risk stratification  2D echo with abnormal EKG and dyspnea on exertion  Coronary calcium score along with correlation with fasting lipid studies for further risk stratification  Not on statin therapy  We will defer at this time  Start perioperative beta-blocker with metoprolol XL 25 daily    See her back in 30 days for follow-up, blood pressure logs twice daily, smoking cessation counseling with efforts reinforced today    Esteban Francisco MD, PhD          The pleasure to be involved in this patient's cardiovascular care.  Please call with any questions or concerns  Esteban Francisco MD, PhD    Most recent EKG as reviewed and interpreted by me:  Procedures     Most recent echo as reviewed and interpreted by me:      Most recent stress test as reviewed and interpreted by me:      Most recent cardiac catheterization as reviewed interpreted by me:  No results found for this or any previous visit.    The following portions of the patient's history were reviewed and updated as appropriate: allergies, current medications, past family history, past medical history, past social history, past surgical history, and problem list.      ROS:  14 point review of systems negative except as mentioned above    Current Outpatient Medications:     amLODIPine (NORVASC) 5 MG tablet, Take 1 tablet by mouth Every Night. continue, Disp: , Rfl:     busPIRone (BUSPAR) 10 MG tablet, Take 1 tablet by mouth 2 (Two) Times a Day. May take morning of surgery, Disp: , Rfl:     traZODone (DESYREL) 50 MG tablet, Take 1 tablet by mouth Every Night. continue, Disp: , Rfl:     Problem List:  There is no problem list on file for this patient.    Past  Medical History:  Past Medical History:   Diagnosis Date    Anxiety     Arthritis     Hypertension     Low back pain     Neuropathy     left foot    Pain in right hip      Past Surgical History:  Past Surgical History:   Procedure Laterality Date    CARPAL TUNNEL RELEASE Bilateral     2/2023    TRIGGER FINGER RELEASE Bilateral     2/2023     Social History:  Social History     Socioeconomic History    Marital status: Single   Tobacco Use    Smoking status: Every Day     Packs/day: 0.50     Years: 42.00     Additional pack years: 0.00     Total pack years: 21.00     Types: Cigarettes    Smokeless tobacco: Never   Vaping Use    Vaping Use: Every day    Substances: Nicotine, Flavoring   Substance and Sexual Activity    Alcohol use: Never    Drug use: Never    Sexual activity: Defer     Allergies:  Allergies   Allergen Reactions    Diclofenac Other (See Comments)     tachycardia     Immunizations:    There is no immunization history on file for this patient.         In-Office Procedure(s):  No orders to display        ASCVD RIsk Score::  [unfilled]    Imaging:    Results for orders placed in visit on 05/30/23    SCANNED - IMAGING               Lab Review:   Hospital Outpatient Visit on 09/21/2023   Component Date Value    QT Interval 09/21/2023 356     QTC Interval 09/21/2023 451    Lab on 09/21/2023   Component Date Value    WBC 09/21/2023 10.00     RBC 09/21/2023 4.78     Hemoglobin 09/21/2023 13.7     Hematocrit 09/21/2023 41.0     MCV 09/21/2023 85.8     MCH 09/21/2023 28.7     MCHC 09/21/2023 33.4     RDW 09/21/2023 12.5     RDW-SD 09/21/2023 39.0     MPV 09/21/2023 9.5     Platelets 09/21/2023 335     Glucose 09/21/2023 118 (H)     BUN 09/21/2023 16     Creatinine 09/21/2023 0.76     Sodium 09/21/2023 140     Potassium 09/21/2023 4.5     Chloride 09/21/2023 102     CO2 09/21/2023 29.8 (H)     Calcium 09/21/2023 9.5     BUN/Creatinine Ratio 09/21/2023 21.1     Anion Gap 09/21/2023 8.2     eGFR 09/21/2023 92.1      MRSA PCR 09/21/2023 No MRSA Detected     ABO Type 09/21/2023 O     RH type 09/21/2023 Positive     Antibody Screen 09/21/2023 Negative     T&S Expiration Date 09/21/2023 9/27/2023 11:59:00 PM      Recent labs reviewed and interpreted for clinical significance and application            Level of Care:           Esteban Francisco MD  11/15/23  .

## 2023-12-15 DIAGNOSIS — R94.31 ABNORMAL EKG: Primary | ICD-10-CM

## 2023-12-15 DIAGNOSIS — R06.09 DOE (DYSPNEA ON EXERTION): ICD-10-CM

## 2025-01-25 ENCOUNTER — APPOINTMENT (OUTPATIENT)
Dept: MRI IMAGING | Facility: HOSPITAL | Age: 60
End: 2025-01-25
Payer: COMMERCIAL

## 2025-01-25 ENCOUNTER — HOSPITAL ENCOUNTER (OUTPATIENT)
Facility: HOSPITAL | Age: 60
Setting detail: OBSERVATION
Discharge: HOME | End: 2025-01-26
Attending: EMERGENCY MEDICINE | Admitting: EMERGENCY MEDICINE
Payer: COMMERCIAL

## 2025-01-25 ENCOUNTER — APPOINTMENT (OUTPATIENT)
Dept: GENERAL RADIOLOGY | Facility: HOSPITAL | Age: 60
End: 2025-01-25
Payer: COMMERCIAL

## 2025-01-25 ENCOUNTER — APPOINTMENT (OUTPATIENT)
Dept: CARDIOLOGY | Facility: HOSPITAL | Age: 60
End: 2025-01-25
Payer: COMMERCIAL

## 2025-01-25 DIAGNOSIS — M25.551 RIGHT HIP PAIN: Primary | ICD-10-CM

## 2025-01-25 LAB
ALBUMIN SERPL-MCNC: 4.3 G/DL (ref 3.5–5.2)
ALBUMIN/GLOB SERPL: 1.1 G/DL
ALP SERPL-CCNC: 96 U/L (ref 39–117)
ALT SERPL W P-5'-P-CCNC: 7 U/L (ref 1–33)
AMPHET+METHAMPHET UR QL: POSITIVE
AMPHETAMINES UR QL: POSITIVE
ANION GAP SERPL CALCULATED.3IONS-SCNC: 11.5 MMOL/L (ref 5–15)
AST SERPL-CCNC: 16 U/L (ref 1–32)
B PARAPERT DNA SPEC QL NAA+PROBE: NOT DETECTED
B PERT DNA SPEC QL NAA+PROBE: NOT DETECTED
BARBITURATES UR QL SCN: NEGATIVE
BASOPHILS # BLD AUTO: 0.03 10*3/MM3 (ref 0–0.2)
BASOPHILS NFR BLD AUTO: 0.3 % (ref 0–1.5)
BENZODIAZ UR QL SCN: NEGATIVE
BH CV LOWER VASCULAR LEFT COMMON FEMORAL AUGMENT: NORMAL
BH CV LOWER VASCULAR LEFT COMMON FEMORAL COMPETENT: NORMAL
BH CV LOWER VASCULAR LEFT COMMON FEMORAL COMPRESS: NORMAL
BH CV LOWER VASCULAR LEFT COMMON FEMORAL PHASIC: NORMAL
BH CV LOWER VASCULAR LEFT COMMON FEMORAL SPONT: NORMAL
BH CV LOWER VASCULAR RIGHT COMMON FEMORAL AUGMENT: NORMAL
BH CV LOWER VASCULAR RIGHT COMMON FEMORAL COMPETENT: NORMAL
BH CV LOWER VASCULAR RIGHT COMMON FEMORAL COMPRESS: NORMAL
BH CV LOWER VASCULAR RIGHT COMMON FEMORAL PHASIC: NORMAL
BH CV LOWER VASCULAR RIGHT COMMON FEMORAL SPONT: NORMAL
BH CV LOWER VASCULAR RIGHT DISTAL FEMORAL COMPRESS: NORMAL
BH CV LOWER VASCULAR RIGHT GASTRONEMIUS COMPRESS: NORMAL
BH CV LOWER VASCULAR RIGHT GREATER SAPH AK COMPRESS: NORMAL
BH CV LOWER VASCULAR RIGHT GREATER SAPH BK COMPRESS: NORMAL
BH CV LOWER VASCULAR RIGHT MID FEMORAL AUGMENT: NORMAL
BH CV LOWER VASCULAR RIGHT MID FEMORAL COMPETENT: NORMAL
BH CV LOWER VASCULAR RIGHT MID FEMORAL COMPRESS: NORMAL
BH CV LOWER VASCULAR RIGHT MID FEMORAL PHASIC: NORMAL
BH CV LOWER VASCULAR RIGHT MID FEMORAL SPONT: NORMAL
BH CV LOWER VASCULAR RIGHT PERONEAL COMPRESS: NORMAL
BH CV LOWER VASCULAR RIGHT POPLITEAL AUGMENT: NORMAL
BH CV LOWER VASCULAR RIGHT POPLITEAL COMPETENT: NORMAL
BH CV LOWER VASCULAR RIGHT POPLITEAL COMPRESS: NORMAL
BH CV LOWER VASCULAR RIGHT POPLITEAL PHASIC: NORMAL
BH CV LOWER VASCULAR RIGHT POPLITEAL SPONT: NORMAL
BH CV LOWER VASCULAR RIGHT POSTERIOR TIBIAL COMPRESS: NORMAL
BH CV LOWER VASCULAR RIGHT PROFUNDA FEMORAL COMPRESS: NORMAL
BH CV LOWER VASCULAR RIGHT PROXIMAL FEMORAL COMPRESS: NORMAL
BH CV LOWER VASCULAR RIGHT SAPHENOFEMORAL JUNCTION COMPRESS: NORMAL
BH CV VAS PRELIMINARY FINDINGS SCRIPTING: 1
BILIRUB SERPL-MCNC: 0.3 MG/DL (ref 0–1.2)
BILIRUB UR QL STRIP: NEGATIVE
BUN SERPL-MCNC: 14 MG/DL (ref 6–20)
BUN/CREAT SERPL: 23 (ref 7–25)
BUPRENORPHINE SERPL-MCNC: NEGATIVE NG/ML
C PNEUM DNA NPH QL NAA+NON-PROBE: NOT DETECTED
CALCIUM SPEC-SCNC: 9 MG/DL (ref 8.6–10.5)
CANNABINOIDS SERPL QL: NEGATIVE
CHLORIDE SERPL-SCNC: 99 MMOL/L (ref 98–107)
CLARITY UR: CLEAR
CO2 SERPL-SCNC: 24.5 MMOL/L (ref 22–29)
COCAINE UR QL: NEGATIVE
COLOR UR: ABNORMAL
CREAT SERPL-MCNC: 0.61 MG/DL (ref 0.57–1)
CRP SERPL-MCNC: 6.09 MG/DL (ref 0–0.5)
D DIMER PPP FEU-MCNC: 2.28 MCGFEU/ML (ref 0–0.58)
D-LACTATE SERPL-SCNC: 0.7 MMOL/L (ref 0.3–2)
DEPRECATED RDW RBC AUTO: 42.2 FL (ref 37–54)
EGFRCR SERPLBLD CKD-EPI 2021: 103.8 ML/MIN/1.73
EOSINOPHIL # BLD AUTO: 0.18 10*3/MM3 (ref 0–0.4)
EOSINOPHIL NFR BLD AUTO: 1.7 % (ref 0.3–6.2)
ERYTHROCYTE [DISTWIDTH] IN BLOOD BY AUTOMATED COUNT: 13.1 % (ref 12.3–15.4)
ERYTHROCYTE [SEDIMENTATION RATE] IN BLOOD: 30 MM/HR (ref 0–30)
FLUAV SUBTYP SPEC NAA+PROBE: NOT DETECTED
FLUBV RNA ISLT QL NAA+PROBE: NOT DETECTED
GLOBULIN UR ELPH-MCNC: 4 GM/DL
GLUCOSE SERPL-MCNC: 91 MG/DL (ref 65–99)
GLUCOSE UR STRIP-MCNC: NEGATIVE MG/DL
HADV DNA SPEC NAA+PROBE: NOT DETECTED
HCOV 229E RNA SPEC QL NAA+PROBE: NOT DETECTED
HCOV HKU1 RNA SPEC QL NAA+PROBE: NOT DETECTED
HCOV NL63 RNA SPEC QL NAA+PROBE: NOT DETECTED
HCOV OC43 RNA SPEC QL NAA+PROBE: NOT DETECTED
HCT VFR BLD AUTO: 39.8 % (ref 34–46.6)
HGB BLD-MCNC: 12.6 G/DL (ref 12–15.9)
HGB UR QL STRIP.AUTO: NEGATIVE
HMPV RNA NPH QL NAA+NON-PROBE: NOT DETECTED
HOLD SPECIMEN: NORMAL
HOLD SPECIMEN: NORMAL
HPIV1 RNA ISLT QL NAA+PROBE: NOT DETECTED
HPIV2 RNA SPEC QL NAA+PROBE: NOT DETECTED
HPIV3 RNA NPH QL NAA+PROBE: NOT DETECTED
HPIV4 P GENE NPH QL NAA+PROBE: NOT DETECTED
IMM GRANULOCYTES # BLD AUTO: 0.02 10*3/MM3 (ref 0–0.05)
IMM GRANULOCYTES NFR BLD AUTO: 0.2 % (ref 0–0.5)
KETONES UR QL STRIP: ABNORMAL
LEUKOCYTE ESTERASE UR QL STRIP.AUTO: NEGATIVE
LYMPHOCYTES # BLD AUTO: 1.44 10*3/MM3 (ref 0.7–3.1)
LYMPHOCYTES NFR BLD AUTO: 13.6 % (ref 19.6–45.3)
M PNEUMO IGG SER IA-ACNC: NOT DETECTED
MCH RBC QN AUTO: 27.8 PG (ref 26.6–33)
MCHC RBC AUTO-ENTMCNC: 31.7 G/DL (ref 31.5–35.7)
MCV RBC AUTO: 87.7 FL (ref 79–97)
METHADONE UR QL SCN: NEGATIVE
MONOCYTES # BLD AUTO: 0.52 10*3/MM3 (ref 0.1–0.9)
MONOCYTES NFR BLD AUTO: 4.9 % (ref 5–12)
NEUTROPHILS NFR BLD AUTO: 79.3 % (ref 42.7–76)
NEUTROPHILS NFR BLD AUTO: 8.41 10*3/MM3 (ref 1.7–7)
NITRITE UR QL STRIP: NEGATIVE
NRBC BLD AUTO-RTO: 0 /100 WBC (ref 0–0.2)
OPIATES UR QL: POSITIVE
OXYCODONE UR QL SCN: POSITIVE
PCP UR QL SCN: NEGATIVE
PH UR STRIP.AUTO: 6 [PH] (ref 5–8)
PLATELET # BLD AUTO: 328 10*3/MM3 (ref 140–450)
PMV BLD AUTO: 9.1 FL (ref 6–12)
POTASSIUM SERPL-SCNC: 3.7 MMOL/L (ref 3.5–5.2)
PROT SERPL-MCNC: 8.3 G/DL (ref 6–8.5)
PROT UR QL STRIP: NEGATIVE
RBC # BLD AUTO: 4.54 10*6/MM3 (ref 3.77–5.28)
RHINOVIRUS RNA SPEC NAA+PROBE: NOT DETECTED
RSV RNA NPH QL NAA+NON-PROBE: NOT DETECTED
SARS-COV-2 RNA RESP QL NAA+PROBE: NOT DETECTED
SODIUM SERPL-SCNC: 135 MMOL/L (ref 136–145)
SP GR UR STRIP: 1.02 (ref 1–1.03)
TRICYCLICS UR QL SCN: NEGATIVE
UROBILINOGEN UR QL STRIP: ABNORMAL
WBC NRBC COR # BLD AUTO: 10.6 10*3/MM3 (ref 3.4–10.8)
WHOLE BLOOD HOLD COAG: NORMAL
WHOLE BLOOD HOLD SPECIMEN: NORMAL

## 2025-01-25 PROCEDURE — G0378 HOSPITAL OBSERVATION PER HR: HCPCS

## 2025-01-25 PROCEDURE — 85025 COMPLETE CBC W/AUTO DIFF WBC: CPT | Performed by: EMERGENCY MEDICINE

## 2025-01-25 PROCEDURE — 73502 X-RAY EXAM HIP UNI 2-3 VIEWS: CPT

## 2025-01-25 PROCEDURE — 96376 TX/PRO/DX INJ SAME DRUG ADON: CPT

## 2025-01-25 PROCEDURE — 85379 FIBRIN DEGRADATION QUANT: CPT | Performed by: EMERGENCY MEDICINE

## 2025-01-25 PROCEDURE — 96374 THER/PROPH/DIAG INJ IV PUSH: CPT

## 2025-01-25 PROCEDURE — 73721 MRI JNT OF LWR EXTRE W/O DYE: CPT

## 2025-01-25 PROCEDURE — 96375 TX/PRO/DX INJ NEW DRUG ADDON: CPT

## 2025-01-25 PROCEDURE — 87040 BLOOD CULTURE FOR BACTERIA: CPT | Performed by: EMERGENCY MEDICINE

## 2025-01-25 PROCEDURE — 81003 URINALYSIS AUTO W/O SCOPE: CPT | Performed by: EMERGENCY MEDICINE

## 2025-01-25 PROCEDURE — 25010000002 HYDROMORPHONE PER 4 MG: Performed by: PHYSICIAN ASSISTANT

## 2025-01-25 PROCEDURE — 36415 COLL VENOUS BLD VENIPUNCTURE: CPT

## 2025-01-25 PROCEDURE — 85652 RBC SED RATE AUTOMATED: CPT | Performed by: EMERGENCY MEDICINE

## 2025-01-25 PROCEDURE — 99285 EMERGENCY DEPT VISIT HI MDM: CPT

## 2025-01-25 PROCEDURE — 80306 DRUG TEST PRSMV INSTRMNT: CPT

## 2025-01-25 PROCEDURE — 0202U NFCT DS 22 TRGT SARS-COV-2: CPT

## 2025-01-25 PROCEDURE — 93971 EXTREMITY STUDY: CPT

## 2025-01-25 PROCEDURE — 25010000002 KETOROLAC TROMETHAMINE PER 15 MG: Performed by: EMERGENCY MEDICINE

## 2025-01-25 PROCEDURE — 93971 EXTREMITY STUDY: CPT | Performed by: STUDENT IN AN ORGANIZED HEALTH CARE EDUCATION/TRAINING PROGRAM

## 2025-01-25 PROCEDURE — 83605 ASSAY OF LACTIC ACID: CPT

## 2025-01-25 PROCEDURE — 25010000002 ONDANSETRON PER 1 MG: Performed by: EMERGENCY MEDICINE

## 2025-01-25 PROCEDURE — 25010000002 MORPHINE PER 10 MG: Performed by: PHYSICIAN ASSISTANT

## 2025-01-25 PROCEDURE — 86140 C-REACTIVE PROTEIN: CPT | Performed by: EMERGENCY MEDICINE

## 2025-01-25 PROCEDURE — P9612 CATHETERIZE FOR URINE SPEC: HCPCS

## 2025-01-25 PROCEDURE — 25010000002 HYDROMORPHONE 1 MG/ML SOLUTION: Performed by: EMERGENCY MEDICINE

## 2025-01-25 PROCEDURE — 25010000002 MORPHINE PER 10 MG: Performed by: EMERGENCY MEDICINE

## 2025-01-25 PROCEDURE — 25010000002 LORAZEPAM PER 2 MG: Performed by: PHYSICIAN ASSISTANT

## 2025-01-25 PROCEDURE — 80053 COMPREHEN METABOLIC PANEL: CPT | Performed by: EMERGENCY MEDICINE

## 2025-01-25 RX ORDER — AMLODIPINE BESYLATE 5 MG/1
5 TABLET ORAL DAILY
COMMUNITY

## 2025-01-25 RX ORDER — ALUMINA, MAGNESIA, AND SIMETHICONE 2400; 2400; 240 MG/30ML; MG/30ML; MG/30ML
15 SUSPENSION ORAL EVERY 6 HOURS PRN
Status: DISCONTINUED | OUTPATIENT
Start: 2025-01-25 | End: 2025-01-26 | Stop reason: HOSPADM

## 2025-01-25 RX ORDER — POLYETHYLENE GLYCOL 3350 17 G/17G
17 POWDER, FOR SOLUTION ORAL DAILY PRN
Status: DISCONTINUED | OUTPATIENT
Start: 2025-01-25 | End: 2025-01-26 | Stop reason: HOSPADM

## 2025-01-25 RX ORDER — ONDANSETRON 4 MG/1
4 TABLET, ORALLY DISINTEGRATING ORAL EVERY 6 HOURS PRN
Status: DISCONTINUED | OUTPATIENT
Start: 2025-01-25 | End: 2025-01-26 | Stop reason: HOSPADM

## 2025-01-25 RX ORDER — AMOXICILLIN 250 MG
2 CAPSULE ORAL 2 TIMES DAILY PRN
Status: DISCONTINUED | OUTPATIENT
Start: 2025-01-25 | End: 2025-01-26 | Stop reason: HOSPADM

## 2025-01-25 RX ORDER — KETOROLAC TROMETHAMINE 30 MG/ML
15 INJECTION, SOLUTION INTRAMUSCULAR; INTRAVENOUS ONCE
Status: COMPLETED | OUTPATIENT
Start: 2025-01-25 | End: 2025-01-25

## 2025-01-25 RX ORDER — ONDANSETRON 2 MG/ML
4 INJECTION INTRAMUSCULAR; INTRAVENOUS ONCE
Status: COMPLETED | OUTPATIENT
Start: 2025-01-25 | End: 2025-01-25

## 2025-01-25 RX ORDER — LORAZEPAM 2 MG/ML
0.5 INJECTION INTRAMUSCULAR ONCE AS NEEDED
Status: DISCONTINUED | OUTPATIENT
Start: 2025-01-25 | End: 2025-01-26 | Stop reason: HOSPADM

## 2025-01-25 RX ORDER — SODIUM CHLORIDE 0.9 % (FLUSH) 0.9 %
10 SYRINGE (ML) INJECTION AS NEEDED
Status: DISCONTINUED | OUTPATIENT
Start: 2025-01-25 | End: 2025-01-26 | Stop reason: HOSPADM

## 2025-01-25 RX ORDER — HYDROMORPHONE HYDROCHLORIDE 1 MG/ML
0.5 INJECTION, SOLUTION INTRAMUSCULAR; INTRAVENOUS; SUBCUTANEOUS ONCE AS NEEDED
Status: COMPLETED | OUTPATIENT
Start: 2025-01-25 | End: 2025-01-25

## 2025-01-25 RX ORDER — BISACODYL 5 MG/1
5 TABLET, DELAYED RELEASE ORAL DAILY PRN
Status: DISCONTINUED | OUTPATIENT
Start: 2025-01-25 | End: 2025-01-26 | Stop reason: HOSPADM

## 2025-01-25 RX ORDER — TRAZODONE HYDROCHLORIDE 100 MG/1
100 TABLET ORAL NIGHTLY
COMMUNITY

## 2025-01-25 RX ORDER — LORAZEPAM 2 MG/ML
1 INJECTION INTRAMUSCULAR ONCE AS NEEDED
Status: DISCONTINUED | OUTPATIENT
Start: 2025-01-25 | End: 2025-01-25

## 2025-01-25 RX ORDER — ONDANSETRON 2 MG/ML
4 INJECTION INTRAMUSCULAR; INTRAVENOUS EVERY 6 HOURS PRN
Status: DISCONTINUED | OUTPATIENT
Start: 2025-01-25 | End: 2025-01-26 | Stop reason: HOSPADM

## 2025-01-25 RX ORDER — NITROGLYCERIN 0.4 MG/1
0.4 TABLET SUBLINGUAL
Status: DISCONTINUED | OUTPATIENT
Start: 2025-01-25 | End: 2025-01-26 | Stop reason: HOSPADM

## 2025-01-25 RX ORDER — SODIUM CHLORIDE 9 MG/ML
40 INJECTION, SOLUTION INTRAVENOUS AS NEEDED
Status: DISCONTINUED | OUTPATIENT
Start: 2025-01-25 | End: 2025-01-26 | Stop reason: HOSPADM

## 2025-01-25 RX ORDER — SODIUM CHLORIDE 0.9 % (FLUSH) 0.9 %
10 SYRINGE (ML) INJECTION EVERY 12 HOURS SCHEDULED
Status: DISCONTINUED | OUTPATIENT
Start: 2025-01-25 | End: 2025-01-26 | Stop reason: HOSPADM

## 2025-01-25 RX ORDER — HYDROCODONE BITARTRATE AND ACETAMINOPHEN 5; 325 MG/1; MG/1
1 TABLET ORAL EVERY 6 HOURS PRN
Status: DISCONTINUED | OUTPATIENT
Start: 2025-01-25 | End: 2025-01-26 | Stop reason: HOSPADM

## 2025-01-25 RX ORDER — BUSPIRONE HYDROCHLORIDE 15 MG/1
15 TABLET ORAL 2 TIMES DAILY
COMMUNITY

## 2025-01-25 RX ORDER — BISACODYL 10 MG
10 SUPPOSITORY, RECTAL RECTAL DAILY PRN
Status: DISCONTINUED | OUTPATIENT
Start: 2025-01-25 | End: 2025-01-26 | Stop reason: HOSPADM

## 2025-01-25 RX ADMIN — HYDROCODONE BITARTRATE AND ACETAMINOPHEN 1 TABLET: 5; 325 TABLET ORAL at 22:39

## 2025-01-25 RX ADMIN — Medication 5 MG: at 22:13

## 2025-01-25 RX ADMIN — HYDROMORPHONE HYDROCHLORIDE 1 MG: 1 INJECTION, SOLUTION INTRAMUSCULAR; INTRAVENOUS; SUBCUTANEOUS at 06:51

## 2025-01-25 RX ADMIN — Medication 10 ML: at 21:15

## 2025-01-25 RX ADMIN — HYDROMORPHONE HYDROCHLORIDE 1 MG: 1 INJECTION, SOLUTION INTRAMUSCULAR; INTRAVENOUS; SUBCUTANEOUS at 08:10

## 2025-01-25 RX ADMIN — Medication 10 ML: at 11:28

## 2025-01-25 RX ADMIN — MORPHINE SULFATE 4 MG: 4 INJECTION, SOLUTION INTRAMUSCULAR; INTRAVENOUS at 06:31

## 2025-01-25 RX ADMIN — LORAZEPAM 1 MG: 2 INJECTION INTRAMUSCULAR; INTRAVENOUS at 11:33

## 2025-01-25 RX ADMIN — HYDROMORPHONE HYDROCHLORIDE 0.5 MG: 1 INJECTION, SOLUTION INTRAMUSCULAR; INTRAVENOUS; SUBCUTANEOUS at 12:06

## 2025-01-25 RX ADMIN — ONDANSETRON 4 MG: 2 INJECTION, SOLUTION INTRAMUSCULAR; INTRAVENOUS at 06:31

## 2025-01-25 RX ADMIN — KETOROLAC TROMETHAMINE 15 MG: 30 INJECTION, SOLUTION INTRAMUSCULAR at 07:42

## 2025-01-25 RX ADMIN — MORPHINE SULFATE 4 MG: 4 INJECTION, SOLUTION INTRAMUSCULAR; INTRAVENOUS at 21:14

## 2025-01-25 NOTE — PLAN OF CARE
Goal Outcome Evaluation:  Plan of Care Reviewed With: patient        Progress: improving  Outcome Evaluation: Ortho MD discussed that patient will be seen out patient since no fracture or infection was found on MRI. Patient states she feels comfortable staying over night and being discharged in the morning. Patient states that diluadid helped with pain control.

## 2025-01-25 NOTE — H&P
"[facility] H&P    Patient Name: Nanci Nunez  : 1965  MRN: [id number]  Primary Care Physician: Kimberly, No Known  Date of admission: 2025     Patient Care Team:  Provider, No Known as PCP - General          Subjective   History Present Illness     Chief Complaint:   Chief Complaint   Patient presents with    Hip Pain         History of Present Illness  Obtained from admitting physician HPI on :  59-year-old female with chronic right hip pain due to have right hip surgery in late February complains of severe pain right hip and right thigh onset this evening.  No trauma, patient had subjective fever yesterday and thought she may have some sort of an illness with muscle aches but states that resolved and then the pain in the right hip and thigh got worse.     25 (postobservation admission):  Patient confirms the HPI noted above including some chronic right hip pain for which she is scheduled to have orthopedic surgery on .  She notes that the pain became acutely worse on the previous day without any obvious provoking event but became increasingly severe described as a \"cramp like a charley horse\" which radiated down the medial and posterior portion of her thigh to just below her right knee.  Some paresthesias were also reported and it had a waxing and waning intensity though continued to worsen since its onset.  She denies any trauma, loss of bowel or bladder control, dyspnea.  She did feel as if she had some myalgias and fever and reports her friend was recently diagnosed with influenza.        ROS  Review of Systems   Constitutional: Positive for fever and malaise/fatigue.   HENT: Negative.     Eyes: Negative.    Cardiovascular: Negative.    Respiratory: Negative.     Skin: Negative.    Musculoskeletal:  Positive for joint pain.   Gastrointestinal: Negative.    Genitourinary: Negative.    Neurological: Negative.    Psychiatric/Behavioral: Negative.         Personal " History     Past Medical History:   Past Medical History:   Diagnosis Date    Anxiety     Arthritis     Hypertension     Low back pain     Neuropathy     left foot    Pain in right hip        Surgical History:      Past Surgical History:   Procedure Laterality Date    CARPAL TUNNEL RELEASE Bilateral     2/2023    TRIGGER FINGER RELEASE Bilateral     2/2023           Family History: family history includes Cancer in her mother; Heart disease in her father; Hypertension in her father and mother. Otherwise pertinent FHx was reviewed and unremarkable.     Social History:  reports that she has been smoking cigarettes. She has a 21 pack-year smoking history. She has never used smokeless tobacco. She reports that she does not drink alcohol and does not use drugs.      Medications:  Prior to Admission medications    Medication Sig Start Date End Date Taking? Authorizing Provider   amLODIPine (NORVASC) 5 MG tablet Take 1 tablet by mouth Every Night. continue 5/12/23 1/25/25  Fawn Harris MD   busPIRone (BUSPAR) 10 MG tablet Take 1 tablet by mouth 2 (Two) Times a Day. May take morning of surgery  1/25/25  Fawn Harris MD   metoprolol succinate XL (TOPROL-XL) 25 MG 24 hr tablet Take 1 tablet by mouth Daily. 11/15/23 1/25/25  Esteban Francisco MD   traZODone (DESYREL) 50 MG tablet Take 1 tablet by mouth Every Night. continue  1/25/25  Fawn Harris MD       Allergies:    Allergies   Allergen Reactions    Diclofenac Other (See Comments)     tachycardia       Objective   Objective     Vital Signs  Temp:  [98.8 °F (37.1 °C)] 98.8 °F (37.1 °C)  Heart Rate:  [] 94  Resp:  [15] 15  BP: (119-146)/(60-95) 132/80  SpO2:  [92 %-99 %] 95 %  on   ;      Body mass index is 22.86 kg/m².    Physical Exam  Vitals reviewed.   Constitutional:       General: She is not in acute distress.     Appearance: Normal appearance. She is normal weight. She is not ill-appearing, toxic-appearing or diaphoretic.   HENT:       Head: Normocephalic.      Right Ear: External ear normal.      Left Ear: External ear normal.      Nose: Nose normal.      Mouth/Throat:      Mouth: Mucous membranes are moist.   Eyes:      Extraocular Movements: Extraocular movements intact.   Cardiovascular:      Rate and Rhythm: Normal rate and regular rhythm.      Pulses: Normal pulses.   Pulmonary:      Effort: Pulmonary effort is normal.      Breath sounds: Normal breath sounds.   Abdominal:      General: Bowel sounds are normal.      Palpations: Abdomen is soft.   Musculoskeletal:         General: Tenderness present. Normal range of motion.      Cervical back: Normal range of motion.      Right lower leg: No edema.      Left lower leg: No edema.   Skin:     General: Skin is warm and dry.      Capillary Refill: Capillary refill takes less than 2 seconds.   Neurological:      General: No focal deficit present.      Mental Status: She is alert.   Psychiatric:         Mood and Affect: Mood normal.         Behavior: Behavior normal.         Thought Content: Thought content normal.         Judgment: Judgment normal.     Results Review:  I have personally reviewed most recent lab results and radiology images and interpretations and agree with findings, most notably: CBC, BMP, lactate, D-dimer, UA, UDS and x-ray of hip.    Results from last 7 days   Lab Units 01/25/25  0627   WBC 10*3/mm3 10.60   HEMOGLOBIN g/dL 12.6   HEMATOCRIT % 39.8   PLATELETS 10*3/mm3 328     Results from last 7 days   Lab Units 01/25/25  0631 01/25/25  0627   SODIUM mmol/L  --  135*   POTASSIUM mmol/L  --  3.7   CHLORIDE mmol/L  --  99   CO2 mmol/L  --  24.5   BUN mg/dL  --  14   CREATININE mg/dL  --  0.61   GLUCOSE mg/dL  --  91   CALCIUM mg/dL  --  9.0   ALK PHOS U/L  --  96   ALT (SGPT) U/L  --  7   AST (SGOT) U/L  --  16   LACTATE mmol/L 0.7  --      Estimated Creatinine Clearance: 90 mL/min (by C-G formula based on SCr of 0.61 mg/dL).  Brief Urine Lab Results  (Last result in the  past 365 days)        Color   Clarity   Blood   Leuk Est   Nitrite   Protein   CREAT   Urine HCG        01/25/25 0850 Dark Yellow   Clear   Negative   Negative   Negative   Negative                   Microbiology Results (last 10 days)       ** No results found for the last 240 hours. **            ECG/EMG Results (most recent)       None            Results for orders placed during the hospital encounter of 01/25/25    Duplex Venous Lower Extremity - Right    Interpretation Summary    Normal right lower extremity venous duplex scan.          XR Hip With or Without Pelvis 2 - 3 View Right    Result Date: 1/25/2025  Impression: Bilateral hip arthritic change greater on the right than the left. Electronically Signed: Neil Vance MD  1/25/2025 7:25 AM EST  Workstation ID: [id number]       Estimated Creatinine Clearance: 90 mL/min (by C-G formula based on SCr of 0.61 mg/dL).    Assessment & Plan   Assessment/Plan       Active Hospital Problems    Diagnosis  POA    **Right hip pain [M25.551]  Yes      Resolved Hospital Problems   No resolved problems to display.       Right hip pain  -WBCs: 10.60  -Lactate: 0.7, CRP: 6.09, sed rate: 30  -D-dimer: 2.28  -UA showed trace ketones but was otherwise unremarkable  -Blood cultures pending  -UDS positive for oxycodone, methamphetamine and amphetamine  -X-ray of hip and pelvis showed arthritic changes right greater than left  -Venous duplex ultrasound ordered in the ED  -Orthopedic surgery consulted  -MRI right hip            VTE Prophylaxis - Active VTE Prophylaxis  Mechanical:        Start        01/25/25 1140  Maintain Sequential Compression Device  Continuous                          Select Pharmacologic VTE Prophylaxis if Desired & Appropriate      CODE STATUS:    Code Status and Medical Interventions: CPR (Attempt to Resuscitate); Full Support   Ordered at: 01/25/25 1139     Code Status (Patient has no pulse and is not breathing):    CPR (Attempt to Resuscitate)     Medical  Interventions (Patient has pulse or is breathing):    Full Support       This patient has been examined wearing personal protective equipment.     I discussed the patient's findings and my recommendations with patient and nursing staff.      Signature:Electronically signed by Davidson Barr PA-C, 01/25/25, 2:50 PM EST.

## 2025-01-25 NOTE — PLAN OF CARE
Goal Outcome Evaluation:  Plan of Care Reviewed With: patient        Progress: no change  Outcome Evaluation: Patient alert and oriented on room air. Patient states that she is anxious and was given 1mg ativan for MRI procedure. Patient states she is in 10/10 RLE pain that radiates into the R hip.

## 2025-01-25 NOTE — ED PROVIDER NOTES
Subjective   History of Present Illness  59-year-old female with chronic right hip pain due to have right hip surgery in late February complains of severe pain right hip and right thigh onset this evening.  No trauma, patient had subjective fever yesterday and thought she may have some sort of an illness with muscle aches but states that resolved and then the pain in the right hip and thigh got worse.      Review of Systems   Constitutional:  Positive for fatigue and fever.   Musculoskeletal:  Positive for myalgias.        As per HPI       Past Medical History:   Diagnosis Date    Anxiety     Arthritis     Hypertension     Low back pain     Neuropathy     left foot    Pain in right hip        Allergies   Allergen Reactions    Diclofenac Other (See Comments)     tachycardia       Past Surgical History:   Procedure Laterality Date    CARPAL TUNNEL RELEASE Bilateral     2/2023    TRIGGER FINGER RELEASE Bilateral     2/2023       Family History   Problem Relation Age of Onset    Hypertension Mother     Cancer Mother     Heart disease Father     Hypertension Father        Social History     Socioeconomic History    Marital status: Single   Tobacco Use    Smoking status: Every Day     Current packs/day: 0.50     Average packs/day: 0.5 packs/day for 84.0 years (42.0 ttl pk-yrs)     Types: Cigarettes     Passive exposure: Current    Smokeless tobacco: Never   Vaping Use    Vaping status: Every Day    Substances: Nicotine, Flavoring   Substance and Sexual Activity    Alcohol use: Never    Drug use: Yes     Types: Amphetamines    Sexual activity: Defer           Objective   Physical Exam  Constitutional:       General: She is in acute distress.   HENT:      Head: Normocephalic and atraumatic.      Mouth/Throat:      Mouth: Mucous membranes are moist.      Pharynx: Oropharynx is clear.   Cardiovascular:      Rate and Rhythm: Tachycardia present.      Heart sounds: Normal heart sounds.   Pulmonary:      Effort: Pulmonary effort  is normal.      Breath sounds: Normal breath sounds.   Abdominal:      General: Bowel sounds are normal. There is no distension.      Palpations: Abdomen is soft.      Tenderness: There is no abdominal tenderness.   Musculoskeletal:      Comments: Right lower extremity normal inspection, intact pedal pulses with normal capillary refill, normal strength and sensation distally.  Patient does have pain with manipulation of the right hip as well as the right knee.  Right thigh is soft without any swelling or erythema or induration, no wounds appreciated.  Calf is soft and nontender.   Skin:     General: Skin is warm and dry.      Capillary Refill: Capillary refill takes less than 2 seconds.   Neurological:      General: No focal deficit present.      Mental Status: She is alert and oriented to person, place, and time.   Psychiatric:         Mood and Affect: Mood normal.         Behavior: Behavior normal.         Procedures           ED Course  ED Course as of 01/29/25 2239   Sat Jan 25, 2025   0748 Care handoff obtained from Dr. Olmos pending disposition after urinalysis and Doppler [KB]   0850 Nursing staff obtaining UA at this time [KB]   0921 Inspect reviewed.  Patient had Norco 7.5 mg prescribed on 8/10/2023 and a quantity of 20 for 5 days.  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 Rosales with orthopedics as he is supposed to do a total hip replacement on 2/26/2025 with patient.  Discussed the above workup and the plan proportional pain after multiple pain medications.  He agreed with MRI of the hip and observation placement at this time for further evaluation and management. [KB]   1023 Patiently will be  placed in observation unit for orthopedic consult and MRI of the right hip.  Discussed this with the patient who verbalized understanding is agreeable plan of care at this time. [KB]      ED Course User Index  [KB] Lay Kincaid APRN                                                       Medical Decision Making  Problems Addressed:  Right hip pain: complicated acute illness or injury    Amount and/or Complexity of Data Reviewed  Labs: ordered.  Radiology: ordered.    Risk  Prescription drug management.  Decision regarding hospitalization.        Final diagnoses:   Right hip pain       ED Disposition  ED Disposition       ED Disposition   Decision to Admit    Condition   --    Comment   --               Provider, No Known  [facility] [place] [address]      5 to 7 days         Medication List        New Prescriptions      methocarbamol 500 MG tablet  Commonly known as: ROBAXIN  Take 1 tablet by mouth Every 6 (Six) Hours As Needed for Muscle Spasms.               Where to Get Your Medications        These medications were sent to [facility] [id number] - [place], [place] - [address] - [phone]  - [phone] FX  [address] [place] [address]      Phone: [phone]   methocarbamol 500 MG tablet            Tayo Olmos MD  01/29/25 5473

## 2025-01-25 NOTE — ED NOTES
Pt arrives to ED c/o R leg pain. Pt states this pain is mostly in her thigh. Pt c/o 10/10 pain. Pt assisted into gown. Pt did have coat, pants, shirt, purse and keys with her at bedside. Belongings were placed on chair next to ER stretcher. Pt awaiting provider wally

## 2025-01-25 NOTE — PLAN OF CARE
Goal Outcome Evaluation:  Plan of Care Reviewed With: patient        Progress: improving  Outcome Evaluation: MRI results pending. Patient sleeping during admission from administration of pain medication, RN will complete admission once patient is able to stay awake for questions.

## 2025-01-26 VITALS
HEART RATE: 98 BPM | OXYGEN SATURATION: 96 % | TEMPERATURE: 99 F | RESPIRATION RATE: 17 BRPM | BODY MASS INDEX: 23 KG/M2 | HEIGHT: 62 IN | WEIGHT: 125 LBS | SYSTOLIC BLOOD PRESSURE: 120 MMHG | DIASTOLIC BLOOD PRESSURE: 76 MMHG

## 2025-01-26 LAB
ANION GAP SERPL CALCULATED.3IONS-SCNC: 11.5 MMOL/L (ref 5–15)
BASOPHILS # BLD AUTO: 0.02 10*3/MM3 (ref 0–0.2)
BASOPHILS NFR BLD AUTO: 0.4 % (ref 0–1.5)
BUN SERPL-MCNC: 14 MG/DL (ref 6–20)
BUN/CREAT SERPL: 25.5 (ref 7–25)
CALCIUM SPEC-SCNC: 8.7 MG/DL (ref 8.6–10.5)
CHLORIDE SERPL-SCNC: 99 MMOL/L (ref 98–107)
CO2 SERPL-SCNC: 22.5 MMOL/L (ref 22–29)
CREAT SERPL-MCNC: 0.55 MG/DL (ref 0.57–1)
DEPRECATED RDW RBC AUTO: 40.8 FL (ref 37–54)
EGFRCR SERPLBLD CKD-EPI 2021: 106.4 ML/MIN/1.73
EOSINOPHIL # BLD AUTO: 0.07 10*3/MM3 (ref 0–0.4)
EOSINOPHIL NFR BLD AUTO: 1.3 % (ref 0.3–6.2)
ERYTHROCYTE [DISTWIDTH] IN BLOOD BY AUTOMATED COUNT: 12.9 % (ref 12.3–15.4)
GLUCOSE SERPL-MCNC: 87 MG/DL (ref 65–99)
HCT VFR BLD AUTO: 35.6 % (ref 34–46.6)
HGB BLD-MCNC: 11.3 G/DL (ref 12–15.9)
IMM GRANULOCYTES # BLD AUTO: 0.01 10*3/MM3 (ref 0–0.05)
IMM GRANULOCYTES NFR BLD AUTO: 0.2 % (ref 0–0.5)
LYMPHOCYTES # BLD AUTO: 0.92 10*3/MM3 (ref 0.7–3.1)
LYMPHOCYTES NFR BLD AUTO: 17 % (ref 19.6–45.3)
MAGNESIUM SERPL-MCNC: 2.2 MG/DL (ref 1.6–2.6)
MCH RBC QN AUTO: 27.4 PG (ref 26.6–33)
MCHC RBC AUTO-ENTMCNC: 31.7 G/DL (ref 31.5–35.7)
MCV RBC AUTO: 86.2 FL (ref 79–97)
MONOCYTES # BLD AUTO: 0.76 10*3/MM3 (ref 0.1–0.9)
MONOCYTES NFR BLD AUTO: 14 % (ref 5–12)
NEUTROPHILS NFR BLD AUTO: 3.63 10*3/MM3 (ref 1.7–7)
NEUTROPHILS NFR BLD AUTO: 67.1 % (ref 42.7–76)
NRBC BLD AUTO-RTO: 0 /100 WBC (ref 0–0.2)
PLATELET # BLD AUTO: 263 10*3/MM3 (ref 140–450)
PMV BLD AUTO: 9.7 FL (ref 6–12)
POTASSIUM SERPL-SCNC: 3.7 MMOL/L (ref 3.5–5.2)
RBC # BLD AUTO: 4.13 10*6/MM3 (ref 3.77–5.28)
SODIUM SERPL-SCNC: 133 MMOL/L (ref 136–145)
WBC NRBC COR # BLD AUTO: 5.41 10*3/MM3 (ref 3.4–10.8)

## 2025-01-26 PROCEDURE — 83735 ASSAY OF MAGNESIUM: CPT | Performed by: PHYSICIAN ASSISTANT

## 2025-01-26 PROCEDURE — 96376 TX/PRO/DX INJ SAME DRUG ADON: CPT

## 2025-01-26 PROCEDURE — 25010000002 MORPHINE PER 10 MG: Performed by: PHYSICIAN ASSISTANT

## 2025-01-26 PROCEDURE — 85025 COMPLETE CBC W/AUTO DIFF WBC: CPT | Performed by: PHYSICIAN ASSISTANT

## 2025-01-26 PROCEDURE — 80048 BASIC METABOLIC PNL TOTAL CA: CPT | Performed by: PHYSICIAN ASSISTANT

## 2025-01-26 PROCEDURE — G0378 HOSPITAL OBSERVATION PER HR: HCPCS

## 2025-01-26 PROCEDURE — 97162 PT EVAL MOD COMPLEX 30 MIN: CPT

## 2025-01-26 RX ORDER — TRAZODONE HYDROCHLORIDE 100 MG/1
100 TABLET ORAL NIGHTLY
Status: DISCONTINUED | OUTPATIENT
Start: 2025-01-26 | End: 2025-01-26 | Stop reason: HOSPADM

## 2025-01-26 RX ORDER — AMLODIPINE BESYLATE 5 MG/1
5 TABLET ORAL DAILY
Status: DISCONTINUED | OUTPATIENT
Start: 2025-01-26 | End: 2025-01-26 | Stop reason: HOSPADM

## 2025-01-26 RX ORDER — METHOCARBAMOL 500 MG/1
500 TABLET, FILM COATED ORAL EVERY 6 HOURS PRN
Qty: 28 TABLET | Refills: 0 | Status: SHIPPED | OUTPATIENT
Start: 2025-01-26

## 2025-01-26 RX ORDER — BUSPIRONE HYDROCHLORIDE 15 MG/1
15 TABLET ORAL 2 TIMES DAILY
Status: DISCONTINUED | OUTPATIENT
Start: 2025-01-26 | End: 2025-01-26 | Stop reason: HOSPADM

## 2025-01-26 RX ORDER — METHOCARBAMOL 500 MG/1
500 TABLET, FILM COATED ORAL EVERY 6 HOURS PRN
Status: DISCONTINUED | OUTPATIENT
Start: 2025-01-26 | End: 2025-01-26 | Stop reason: HOSPADM

## 2025-01-26 RX ORDER — TRAZODONE HYDROCHLORIDE 100 MG/1
100 TABLET ORAL NIGHTLY
Status: DISCONTINUED | OUTPATIENT
Start: 2025-01-26 | End: 2025-01-26

## 2025-01-26 RX ADMIN — BUSPIRONE HYDROCHLORIDE 15 MG: 15 TABLET ORAL at 08:05

## 2025-01-26 RX ADMIN — MORPHINE SULFATE 4 MG: 4 INJECTION, SOLUTION INTRAMUSCULAR; INTRAVENOUS at 03:24

## 2025-01-26 RX ADMIN — AMLODIPINE BESYLATE 5 MG: 5 TABLET ORAL at 08:05

## 2025-01-26 RX ADMIN — Medication 10 ML: at 08:05

## 2025-01-26 RX ADMIN — MORPHINE SULFATE 4 MG: 4 INJECTION, SOLUTION INTRAMUSCULAR; INTRAVENOUS at 08:28

## 2025-01-26 RX ADMIN — HYDROCODONE BITARTRATE AND ACETAMINOPHEN 1 TABLET: 5; 325 TABLET ORAL at 10:57

## 2025-01-26 RX ADMIN — METHOCARBAMOL TABLETS 500 MG: 500 TABLET, COATED ORAL at 08:28

## 2025-01-26 RX ADMIN — HYDROCODONE BITARTRATE AND ACETAMINOPHEN 1 TABLET: 5; 325 TABLET ORAL at 04:40

## 2025-01-26 NOTE — PLAN OF CARE
Problem: Adult Inpatient Plan of Care  Goal: Absence of Hospital-Acquired Illness or Injury  Intervention: Identify and Manage Fall Risk  Recent Flowsheet Documentation  Taken 1/26/2025 0200 by Karla Muse RN  Safety Promotion/Fall Prevention: safety round/check completed  Taken 1/26/2025 0000 by Karla Muse RN  Safety Promotion/Fall Prevention: safety round/check completed  Taken 1/25/2025 2200 by Karla Muse RN  Safety Promotion/Fall Prevention: safety round/check completed  Taken 1/25/2025 2030 by Karla Muse RN  Safety Promotion/Fall Prevention: assistive device/personal items within reach     Problem: Adult Inpatient Plan of Care  Goal: Absence of Hospital-Acquired Illness or Injury  Intervention: Prevent Skin Injury  Recent Flowsheet Documentation  Taken 1/26/2025 0200 by Karla Muse RN  Body Position: position changed independently  Taken 1/26/2025 0000 by Karla Muse RN  Body Position: position changed independently  Taken 1/25/2025 2200 by Karla Muse RN  Body Position: position changed independently  Taken 1/25/2025 2030 by Karla Muse RN  Body Position: position changed independently     Problem: Adult Inpatient Plan of Care  Goal: Absence of Hospital-Acquired Illness or Injury  Intervention: Prevent Infection  Recent Flowsheet Documentation  Taken 1/26/2025 0200 by Karla Muse RN  Infection Prevention: environmental surveillance performed  Taken 1/26/2025 0000 by Karla Muse RN  Infection Prevention: environmental surveillance performed  Taken 1/25/2025 2200 by Karla Muse RN  Infection Prevention: environmental surveillance performed  Taken 1/25/2025 2030 by Karla Muse RN  Infection Prevention: environmental surveillance performed     Problem: Adult Inpatient Plan of Care  Goal: Optimal Comfort and Wellbeing  Outcome: Progressing  Intervention: Monitor Pain and Promote Comfort  Recent Flowsheet Documentation  Taken 1/25/2025 2309 by Karla Muse RN  Pain  Management Interventions:   position adjusted   heat applied   pillow support provided  Intervention: Provide Person-Centered Care  Recent Flowsheet Documentation  Taken 1/25/2025 2030 by Karla Muse, RN  Trust Relationship/Rapport:   care explained   questions answered   questions encouraged   Goal Outcome Evaluation:

## 2025-01-26 NOTE — THERAPY EVALUATION
Patient Name: Nanci Nunez  : 1965    MRN: [id number]                              Today's Date: 2025       Admit Date: 2025    Visit Dx:     ICD-10-CM ICD-9-CM   1. Right hip pain  M25.551 719.45     Patient Active Problem List   Diagnosis    Right hip pain     Past Medical History:   Diagnosis Date    Anxiety     Arthritis     Hypertension     Low back pain     Neuropathy     left foot    Pain in right hip      Past Surgical History:   Procedure Laterality Date    CARPAL TUNNEL RELEASE Bilateral     2023    TRIGGER FINGER RELEASE Bilateral     2023      General Information       Row Name 25 0909          Physical Therapy Time and Intention    Document Type evaluation  -SS     Mode of Treatment physical therapy  -       Row Name 25 0909          General Information    Patient Profile Reviewed yes  -SS     Prior Level of Function independent:;community mobility;all household mobility;ADL's  -       Row Name 25 0909          Living Environment    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  -       Row Name 25 0909          Home Main Entrance    Number of Stairs, Main Entrance three  -SS       Row Name 25 0909          Stairs Within Home, Primary    Stairs Comment, Within Home, Primary flight of stairs to upstairs where her dad stays  -       Row Name 25 0909          Cognition    Orientation Status (Cognition) oriented x 4  -SS               User Key  (r) = Recorded By, (t) = Taken By, (c) = Cosigned By      Initials Name Provider Type     Larissa Mendoza, PT Physical Therapist                   Mobility       Row Name 25 1529          Bed Mobility    Bed Mobility bed mobility (all) activities  -SS     All Activities, [place] (Bed Mobility) independent  -SS       Row Name 25 1529          Sit-Stand Transfer    Sit-Stand [place] (Transfers) independent  -SS       Row Name 25 1529           Gait/Stairs (Locomotion)    Posey Level (Gait) modified independence  -     Assistive Device (Gait) walker, front-wheeled  -     Distance in Feet (Gait) 25  -SS     Comment, (Gait/Stairs) antalgic  -               User Key  (r) = Recorded By, (t) = Taken By, (c) = Cosigned By      Initials Name Provider Type     Larissa Mendoza, PT Physical Therapist                   Obj/Interventions       Row Name 01/26/25 1530          Range of Motion Comprehensive    Comment, General Range of Motion guarded with R hip ROM due to pain, L hip ROM WFL  -       Row Name 01/26/25 1530          Strength Comprehensive (MMT)    Comment, General Manual Muscle Testing (MMT) Assessment R hip unable to lift against gravity, unable to extend R knee against gravity; L LE WFL  -       Row Name 01/26/25 1530          Balance    Balance Assessment sitting static balance;standing static balance  -     Static Sitting Balance independent  -     Static Standing Balance independent  -       Row Name 01/26/25 1530          Sensory Assessment (Somatosensory)    Sensory Assessment (Somatosensory) sensation intact  -               User Key  (r) = Recorded By, (t) = Taken By, (c) = Cosigned By      Initials Name Provider Type     Larissa Mendoza, PT Physical Therapist                   Goals/Plan    No documentation.                  Clinical Impression       Row Name 01/26/25 4033          Pain    Additional Documentation Pain Scale: FACES Pre/Post-Treatment (Group)  -[facility] Name 01/26/25 1573          Pain Scale: FACES Pre/Post-Treatment    Pain: FACES Scale, Pretreatment 6-->hurts even more  -     Posttreatment Pain Rating 6-->hurts even more  -       Row Name 01/26/25 8693          Plan of Care Review    Plan of Care Reviewed With patient  -     Outcome Evaluation 60 y/o F who presented with severe R hip pain. Diagnosed with arthritic changes of bilateral hips R>L. also with  large para labral cyst at  the superolateral margin of the right hip measuring up to 4.3 cm.Patient is independent and works as a , house sits and pet sits. Pt lives with her father whom she states she cares for but also states hes independent. He lives upstairs and she lives on first floor. She does not have RW. She is tearful upon entry about her pain and feeling like she cannot function until her hip replacement. Educated her to call her ortho surgeon tomorrow for guidance on MRI findings. Patient was able to mobilize mod I with RW. She is safe to d/c home from PT standpoint with RW.  -       Row Name 01/26/25 1533          Therapy Assessment/Plan (PT)    Criteria for Skilled Interventions Met (PT) no  -SS     Therapy Frequency (PT) evaluation only  -       Row Name 01/26/25 1533          Positioning and Restraints    Pre-Treatment Position in bed  -SS     Post Treatment Position bed  -SS               User Key  (r) = Recorded By, (t) = Taken By, (c) = Cosigned By      Initials Name Provider Type     Larissa Mendoza PT Physical Therapist                   Outcome Measures    No documentation.                                Physical Therapy Education       Title: PT OT SLP Therapies (Resolved)       Topic: Physical Therapy (Resolved)       Point: Mobility training (Resolved)       Learning Progress Summary            Patient Acceptance, E,TB, VU by  at 1/25/2025 1859    Acceptance, E,TB, VU by  at 1/25/2025 1150                      Point: Home exercise program (Resolved)       Learning Progress Summary            Patient Acceptance, E,TB, VU by  at 1/25/2025 1859    Acceptance, E,TB, VU by  at 1/25/2025 1150                                      User Key       Initials Effective Dates Name Provider Type Discipline     04/25/23 -  Cecily De Leon, RN Registered Nurse Nurse                  PT Recommendation and Plan     Outcome Evaluation: 60 y/o F who presented with severe R hip pain. Diagnosed with arthritic  changes of bilateral hips R>L. also with  large para labral cyst at the superolateral margin of the right hip measuring up to 4.3 cm.Patient is independent and works as a , house sits and pet sits. Pt lives with her father whom she states she cares for but also states hes independent. He lives upstairs and she lives on first floor. She does not have RW. She is tearful upon entry about her pain and feeling like she cannot function until her hip replacement. Educated her to call her ortho surgeon tomorrow for guidance on MRI findings. Patient was able to mobilize mod I with RW. She is safe to d/c home from PT standpoint with RW.     Time Calculation:   PT Evaluation Complexity  History, PT Evaluation Complexity: 3 or more personal factors and/or comorbidities  Examination of Body Systems (PT Eval Complexity): total of 3 or more elements  Clinical Presentation (PT Evaluation Complexity): evolving  Clinical Decision Making (PT Evaluation Complexity): moderate complexity  Overall Complexity (PT Evaluation Complexity): moderate complexity     PT Charges       Row Name 01/26/25 1538             Time Calculation    Start Time 0859  -      Stop Time 0924  -      Time Calculation (min) 25 min  -      PT Received On 01/26/25  -         Time Calculation- PT    Total Timed Code Minutes- PT 0 minute(s)  -SS                User Key  (r) = Recorded By, (t) = Taken By, (c) = Cosigned By      Initials Name Provider Type    SS Larissa Mendoza, PT Physical Therapist                  Therapy Charges for Today       Code Description Service Date Service Provider Modifiers Qty    51755166053 HC PT EVAL MOD COMPLEXITY 4 1/26/2025 Larissa Mendoza, PT GP 1            PT G-Codes  AM-PAC 6 Clicks Score (PT): 19  PT Discharge Summary  Anticipated Discharge Disposition (PT): home with outpatient therapy services    Larissa Mendoza PT  1/26/2025

## 2025-01-26 NOTE — CASE MANAGEMENT/SOCIAL WORK
Social Work Assessment  [facility]     Patient Name: Nanci Nunez  MRN: [id number]  Today's Date: 1/26/2025    Admit Date: 1/25/2025         Discharge Plan       Row Name 01/26/25 1644       Plan    Plan Comments LSW attempted to meet with Pt regarding positive methamphetamine UDS. Pt has discharged home.           MARION Henson, MSW    Phone: [phone]  Fax: [phone]  Email: [email or web address]

## 2025-01-26 NOTE — DISCHARGE PLACEMENT REQUEST
"Macario Nunez (59 y.o. Female)       Date of Birth   1965    Social Security Number       Address   [address] [place] [address]    Home Phone   [phone]    MRN   [id number]       Amish   Anabaptist    Marital Status   Single                            Admission Date   1/25/25    Admission Type   Emergency    Admitting Provider   Neil Olson MD    Attending Provider   Neil Olson MD    Department, Room/Bed   [facility] OBSERVATION, 109/1       Discharge Date       Discharge Disposition   Home or Self Care    Discharge Destination                                 Attending Provider: Neil Olson MD    Allergies: Diclofenac    Isolation: None   Infection: None   Code Status: CPR    Ht: 157.5 cm (62\")   Wt: 56.7 kg (125 lb)    Admission Cmt: None   Principal Problem: Right hip pain [M25.551]                   Active Insurance as of 1/25/2025       Primary Coverage       Payor Plan Insurance Group Employer/Plan Group    [employer] IND EXCHANGE [employer] EXCHANGE [id number]       Payor Plan Address Payor Plan Phone Number Payor Plan Fax Number Effective Dates    [address] [phone]  3/1/2023 - None Entered    [facility] [id number]         Subscriber Name Subscriber Birth Date Member ID       MACARIO NUNEZ 1965 [id number]                     Emergency Contacts        (Rel.) Home Phone Work Phone Mobile Phone    MARY CARMEN NUNEZ (Father) [phone] -- [phone]                "

## 2025-01-26 NOTE — DISCHARGE SUMMARY
"[facility]    Provider, No Known    CHIEF COMPLAINT:     Right hip pain    HISTORY OF PRESENT ILLNESS:    Obtained from admitting physician HPI on 1/25*2025:  59-year-old female with chronic right hip pain due to have right hip surgery in late February complains of severe pain right hip and right thigh onset this evening.  No trauma, patient had subjective fever yesterday and thought she may have some sort of an illness with muscle aches but states that resolved and then the pain in the right hip and thigh got worse.    01/25/25 (postobservation admission):  Patient confirms the HPI noted above including some chronic right hip pain for which she is scheduled to have orthopedic surgery on 2/26.  She notes that the pain became acutely worse on the previous day without any obvious provoking event but became increasingly severe described as a \"cramp like a charley horse\" which radiated down the medial and posterior portion of her thigh to just below her right knee.  Some paresthesias were also reported and it had a waxing and waning intensity though continued to worsen since its onset.  She denies any trauma, loss of bowel or bladder control, dyspnea.  She did feel as if she had some myalgias and fever and reports her friend was recently diagnosed with influenza.    1/26/2025:  Patient reports continued pain though somewhat improved from the previous day with no other new or acute symptoms.  Discussed with patient orthopedic review recommending no changes to plans for outpatient surgery at this time though she may contact their office the following day to discuss possibility of moving the procedure up if available.  PT worked with patient who did recommend rolling walker at discharge          Past Medical History:   Diagnosis Date    Anxiety     Arthritis     Hypertension     Low back pain     Neuropathy     left foot    Pain in right hip      Past Surgical History:   Procedure Laterality Date    " CARPAL TUNNEL RELEASE Bilateral     2/2023    TRIGGER FINGER RELEASE Bilateral     2/2023     Family History   Problem Relation Age of Onset    Hypertension Mother     Cancer Mother     Heart disease Father     Hypertension Father      Social History     Tobacco Use    Smoking status: Every Day     Current packs/day: 0.50     Average packs/day: 0.5 packs/day for 84.0 years (42.0 ttl pk-yrs)     Types: Cigarettes     Passive exposure: Current    Smokeless tobacco: Never   Vaping Use    Vaping status: Every Day    Substances: Nicotine, Flavoring   Substance Use Topics    Alcohol use: Never    Drug use: Yes     Types: Amphetamines     Medications Prior to Admission   Medication Sig Dispense Refill Last Dose/Taking    amLODIPine (NORVASC) 5 MG tablet Take 1 tablet by mouth Daily.   Past Week    busPIRone (BUSPAR) 15 MG tablet Take 1 tablet by mouth 2 (Two) Times a Day.   Past Week    traZODone (DESYREL) 100 MG tablet Take 1 tablet by mouth Every Night.   1/24/2025     Allergies:  Diclofenac    There is no immunization history for the selected administration types on file for this patient.        REVIEW OF SYSTEMS:    Review of Systems   Constitutional: Positive for fever and malaise/fatigue.   HENT: Negative.     Eyes: Negative.    Cardiovascular: Negative.    Respiratory: Negative.     Skin: Negative.    Musculoskeletal:  Positive for joint pain.   Gastrointestinal: Negative.    Genitourinary: Negative.    Neurological: Negative.    Psychiatric/Behavioral: Negative.         Vital Signs  Temp:  [98.1 °F (36.7 °C)-100.5 °F (38.1 °C)] 98.1 °F (36.7 °C)  Heart Rate:  [] 84  Resp:  [18-23] 18  BP: (121-146)/(75-90) 131/90          Physical Exam:  Physical Exam  Vitals reviewed.   Constitutional:       General: She is not in acute distress.     Appearance: Normal appearance. She is normal weight. She is not ill-appearing, toxic-appearing or diaphoretic.   HENT:      Head: Normocephalic.      Right Ear: External ear  normal.      Left Ear: External ear normal.      Nose: Nose normal.      Mouth/Throat:      Mouth: Mucous membranes are moist.   Eyes:      Extraocular Movements: Extraocular movements intact.   Cardiovascular:      Rate and Rhythm: Normal rate and regular rhythm.      Pulses: Normal pulses.   Pulmonary:      Effort: Pulmonary effort is normal.      Breath sounds: Normal breath sounds.   Abdominal:      General: Bowel sounds are normal.      Palpations: Abdomen is soft.   Musculoskeletal:         General: Tenderness present. Normal range of motion.      Cervical back: Normal range of motion.      Right lower leg: No edema.      Left lower leg: No edema.   Skin:     General: Skin is warm and dry.      Capillary Refill: Capillary refill takes less than 2 seconds.   Neurological:      General: No focal deficit present.      Mental Status: She is alert.   Psychiatric:         Mood and Affect: Mood normal.         Behavior: Behavior normal.         Thought Content: Thought content normal.         Judgment: Judgment normal.         Emotional Behavior:   Normal   Debilities:  None  Results Review:    I reviewed the patient's new clinical results.  Lab Results (most recent)       Procedure Component Value Units Date/Time    Urine Drug Screen - Straight Cath [[id number]]  (Abnormal) Collected: 01/25/25 0850    Specimen: Urine from Straight Cath Updated: 01/25/25 0908     THC, Screen, Urine Negative     Phencyclidine (PCP), Urine Negative     Cocaine Screen, Urine Negative     Methamphetamine, Ur Positive     Opiate Screen Positive     Amphetamine Screen, Urine Positive     Benzodiazepine Screen, Urine Negative     Tricyclic Antidepressants Screen Negative     Methadone Screen, Urine Negative     Barbiturates Screen, Urine Negative     Oxycodone Screen, Urine Positive     Buprenorphine, Screen, Urine Negative    Narrative:      Cutoff For Drugs Screened:    Amphetamines               500 ng/ml  Barbiturates               200  ng/ml  Benzodiazepines            150 ng/ml  Cocaine                    150 ng/ml  Methadone                  200 ng/ml  Opiates                    100 ng/ml  Phencyclidine               25 ng/ml  THC                         50 ng/ml  Methamphetamine            500 ng/ml  Tricyclic Antidepressants  300 ng/ml  Oxycodone                  100 ng/ml  Buprenorphine               10 ng/ml    The normal value for all drugs tested is negative. This report includes unconfirmed screening results, with the cutoff values listed, to be used for medical treatment purposes only.  Unconfirmed results must not be used for non-medical purposes such as employment or legal testing.  Clinical consideration should be applied to any drug of abuse test, particularly when unconfirmed results are used.    All urine drugs of abuse requests without chain of custody are for medical screening purposes only.  False positives are possible.      Urinalysis With Culture If Indicated - Straight Cath [[id number]]  (Abnormal) Collected: 01/25/25 0850    Specimen: Urine from Straight Cath Updated: 01/25/25 0859     Color, UA Dark Yellow     Appearance, UA Clear     pH, UA 6.0     Specific Gravity, UA 1.023     Glucose, UA Negative     Ketones, UA Trace     Bilirubin, UA Negative     Blood, UA Negative     Protein, UA Negative     Leuk Esterase, UA Negative     Nitrite, UA Negative     Urobilinogen, UA 1.0 E.U./dL    Narrative:      In absence of clinical symptoms, the presence of pyuria, bacteria, and/or nitrites on the urinalysis result does not correlate with infection.  Urine microscopic not indicated.    C-reactive Protein [[id number]]  (Abnormal) Collected: 01/25/25 0627    Specimen: Blood Updated: 01/25/25 0821     C-Reactive Protein 6.09 mg/dL     Comprehensive Metabolic Panel [[id number]]  (Abnormal) Collected: 01/25/25 0627    Specimen: Blood Updated: 01/25/25 0658     Glucose 91 mg/dL      BUN 14 mg/dL      Creatinine 0.61 mg/dL      Sodium 135  mmol/L      Potassium 3.7 mmol/L      Chloride 99 mmol/L      CO2 24.5 mmol/L      Calcium 9.0 mg/dL      Total Protein 8.3 g/dL      Albumin 4.3 g/dL      ALT (SGPT) 7 U/L      AST (SGOT) 16 U/L      Alkaline Phosphatase 96 U/L      Total Bilirubin 0.3 mg/dL      Globulin 4.0 gm/dL      A/G Ratio 1.1 g/dL      BUN/Creatinine Ratio 23.0     Anion Gap 11.5 mmol/L      eGFR 103.8 mL/min/1.73     Narrative:      GFR Categories in Chronic Kidney Disease (CKD)      GFR Category          GFR (mL/min/1.73)    Interpretation  G1                     90 or greater         Normal or high (1)  G2                      60-89                Mild decrease (1)  G3a                   45-59                Mild to moderate decrease  G3b                   30-44                Moderate to severe decrease  G4                    15-29                Severe decrease  G5                    14 or less           Kidney failure          (1)In the absence of evidence of kidney disease, neither GFR category G1 or G2 fulfill the criteria for CKD.    eGFR calculation 2021 CKD-EPI creatinine equation, which does not include race as a factor    D-dimer, Quantitative [[id number]]  (Abnormal) Collected: 01/25/25 0627    Specimen: Blood Updated: 01/25/25 0656     D-Dimer, Quantitative 2.28 MCGFEU/mL     Narrative:      According to the assay 's published package insert, a normal (<0.50 MCGFEU/mL) D-dimer result in conjunction with a non-high clinical probability assessment, excludes deep vein thrombosis (DVT) and pulmonary embolism (PE) with high sensitivity.    D-dimer values increase with age and this can make VTE exclusion of an older population difficult. To address this, the [employer], based on best available evidence and recent guidelines, recommends that clinicians use age-adjusted D-dimer thresholds in patients greater than 50 years of age with: a) a low probability of PE who do not meet all Pulmonary Embolism  "Rule Out Criteria, or b) in those with intermediate probability of PE.   The formula for an age-adjusted D-dimer cut-off is \"age/100\".  For example, a 60 year old patient would have an age-adjusted cut-off of 0.60 MCGFEU/mL and an 80 year old 0.80 MCGFEU/mL.    [place] Draw [[id number]] Collected: 01/25/25 0627    Specimen: Blood Updated: 01/25/25 0645    Narrative:      The following orders were created for panel order [place] Draw.  Procedure                               Abnormality         Status                     ---------                               -----------         ------                     Green Top (Gel)[[id number]]                                  Final result               Lavender Top[[id number]]                                     Final result               Gold Top - SST[[id number]]                                   Final result               Light Blue Top[[id number]]                                   Final result                 Please view results for these tests on the individual orders.    Green Top (Gel) [[id number]] Collected: 01/25/25 0627    Specimen: Blood Updated: 01/25/25 0645     Extra Tube Hold for add-ons.     Comment: Auto resulted.       Lavender Top [[id number]] Collected: 01/25/25 0627    Specimen: Blood Updated: 01/25/25 0645     Extra Tube hold for add-on     Comment: Auto resulted       Gold Top - SST [[id number]] Collected: 01/25/25 0627    Specimen: Blood Updated: 01/25/25 0645     Extra Tube Hold for add-ons.     Comment: Auto resulted.       Light Blue Top [[id number]] Collected: 01/25/25 0627    Specimen: Blood Updated: 01/25/25 0645     Extra Tube Hold for add-ons.     Comment: Auto resulted       Sedimentation Rate [[id number]]  (Normal) Collected: 01/25/25 0627    Specimen: Blood Updated: 01/25/25 0641     Sed Rate 30 mm/hr     CBC & Differential [[id number]]  (Abnormal) Collected: 01/25/25 0627    Specimen: Blood Updated: 01/25/25 0635    Narrative:      The following orders " were created for panel order CBC & Differential.  Procedure                               Abnormality         Status                     ---------                               -----------         ------                     CBC Auto Differential[[id number]]        Abnormal            Final result                 Please view results for these tests on the individual orders.    CBC Auto Differential [[id number]]  (Abnormal) Collected: 01/25/25 0627    Specimen: Blood Updated: 01/25/25 0635     WBC 10.60 10*3/mm3      RBC 4.54 10*6/mm3      Hemoglobin 12.6 g/dL      Hematocrit 39.8 %      MCV 87.7 fL      MCH 27.8 pg      MCHC 31.7 g/dL      RDW 13.1 %      RDW-SD 42.2 fl      MPV 9.1 fL      Platelets 328 10*3/mm3      Neutrophil % 79.3 %      Lymphocyte % 13.6 %      Monocyte % 4.9 %      Eosinophil % 1.7 %      Basophil % 0.3 %      Immature Grans % 0.2 %      Neutrophils, Absolute 8.41 10*3/mm3      Lymphocytes, Absolute 1.44 10*3/mm3      Monocytes, Absolute 0.52 10*3/mm3      Eosinophils, Absolute 0.18 10*3/mm3      Basophils, Absolute 0.03 10*3/mm3      Immature Grans, Absolute 0.02 10*3/mm3      nRBC 0.0 /100 WBC     POC Lactate [[id number]]  (Normal) Collected: 01/25/25 0631    Specimen: Blood Updated: 01/25/25 0633     Lactate 0.7 mmol/L      Comment: Serial Number: [id number]:  [id number]       Blood Culture - Blood, Arm, Left [[id number]] Collected: 01/25/25 0627    Specimen: Blood from Arm, Left Updated: 01/25/25 0632    Blood Culture - Blood, Arm, Right [[id number]] Collected: 01/25/25 0627    Specimen: Blood from Arm, Right Updated: 01/25/25 0631            Imaging Results (Most Recent)       Procedure Component Value Units Date/Time    MRI Hip Right Without Contrast [[id number]] Collected: 01/25/25 1518     Updated: 01/25/25 1526    Narrative:      MRI HIP RIGHT WO CONTRAST    Date of Exam: 1/25/2025 12:39 PM EST    Indication: Severe R hip pain with neg XR and no known trauma.     Comparison: None  available.    Technique:  Routine multiplanar/multisequence images of the right hip were obtained without contrast administration.        FINDINGS:  The study is mildly limited due to motion degradation despite attempts at optimal imaging.    There is no evidence for fracture or dislocation. There is no evidence for stress injury or stress reaction. No osseous contusions are identified. There is no evidence for osseous necrosis.    The articulations of the hips are intact bilaterally. Osteoarthritic degenerative changes of the bilateral hips are noted with evidence for articular cartilage loss, joint space narrowing, subchondral edema/sclerosis, and osteophytosis. These findings   are more pronounced on the right. A right hip joint effusion is noted. There is evidence for a large paralabral cyst arising from the superior lateral margin of the labrum of the right hip measuring approximately 3.6 x 4.3 cm.     The myotendinous insertions of the bilateral hips and pelvis are grossly intact without avulsion. Changes of tendinopathy are noted. No abnormal fluid collection is seen within the surrounding soft tissues.    No additional abnormal bone marrow signal is seen throughout the remainder of the osseous pelvis. Age-related changes are noted. No acute abnormalities are seen involving the intraperitoneal soft tissues.      Impression:      1.No evidence for fracture or dislocation. No evidence for stress injury or stress reaction. No evidence for osseous necrosis.  2.Moderate to severe osteoarthritic degenerative changes of the bilateral hips. These changes are more pronounced on the right. An associated reactive joint effusion is noted on the right.  3.There is evidence for large para labral cyst at the superolateral margin of the right hip measuring up to 4.3 cm.  4.Evidence for chronic tendinopathy involving the myotendinous attachments associated with the hips and pelvis bilaterally. There is no evidence for  myotendinous avulsion.        Electronically Signed: Allan Gillis MD    1/25/2025 3:23 PM EST    Workstation ID: [id number]    XR Hip With or Without Pelvis 2 - 3 View Right [[id number]] Collected: 01/25/25 0723     Updated: 01/25/25 0727    Narrative:      XR HIP W OR WO PELVIS 2-3 VIEW RIGHT    Date of Exam: 1/25/2025 7:21 AM EST    Indication: pain    Comparison: Right hip 5/30/2023    Findings:  There is no evidence of an acute hip fracture. There is bilateral hip arthritic change greater on the right than the left. There are no lytic or destructive bony lesions.      Impression:      Impression:  Bilateral hip arthritic change greater on the right than the left.          Electronically Signed: Neil Vance MD    1/25/2025 7:25 AM EST    Workstation ID: [id number]          reviewed    ECG/EMG Results (most recent)       None          not reviewed    Results for orders placed during the hospital encounter of 01/25/25    Duplex Venous Lower Extremity - Right    Interpretation Summary    Normal right lower extremity venous duplex scan.          Microbiology Results (last 10 days)       Procedure Component Value - Date/Time    Respiratory Panel PCR w/COVID-19(SARS-CoV-2) [facility] In-House, NP Swab in UTM/VTM, 2 HR TAT - Swab, Nasopharynx [[id number]]  (Normal) Collected: 01/25/25 1502    Lab Status: Final result Specimen: Swab from Nasopharynx Updated: 01/25/25 1559     ADENOVIRUS, PCR Not Detected     Coronavirus 229E Not Detected     Coronavirus HKU1 Not Detected     Coronavirus NL63 Not Detected     Coronavirus OC43 Not Detected     COVID19 Not Detected     Human Metapneumovirus Not Detected     Human Rhinovirus/Enterovirus Not Detected     Influenza A PCR Not Detected     Influenza B PCR Not Detected     Parainfluenza Virus 1 Not Detected     Parainfluenza Virus 2 Not Detected     Parainfluenza Virus 3 Not Detected     Parainfluenza Virus 4 Not Detected     RSV, PCR Not Detected     Bordetella  pertussis pcr Not Detected     Bordetella parapertussis PCR Not Detected     Chlamydophila pneumoniae PCR Not Detected     Mycoplasma pneumo by PCR Not Detected    Narrative:      In the setting of a positive respiratory panel with a viral infection PLUS a negative procalcitonin without other underlying concern for bacterial infection, consider observing off antibiotics or discontinuation of antibiotics and continue supportive care. If the respiratory panel is positive for atypical bacterial infection (Bordetella pertussis, Chlamydophila pneumoniae, or Mycoplasma pneumoniae), consider antibiotic de-escalation to target atypical bacterial infection.    Blood Culture - Blood, Arm, Right [[id number]]  (Normal) Collected: 01/25/25 0627    Lab Status: Preliminary result Specimen: Blood from Arm, Right Updated: 01/26/25 0645     Blood Culture No growth at 24 hours    Blood Culture - Blood, Arm, Left [[id number]]  (Normal) Collected: 01/25/25 0627    Lab Status: Preliminary result Specimen: Blood from Arm, Left Updated: 01/26/25 0645     Blood Culture No growth at 24 hours            Assessment & Plan     Right hip pain       Right hip pain  -WBCs: 10.60  -Lactate: 0.7, CRP: 6.09, sed rate: 30  -D-dimer: 2.28  -UA showed trace ketones but was otherwise unremarkable  -Blood cultures pending  -UDS positive for oxycodone, methamphetamine and amphetamine  -X-ray of hip and pelvis showed arthritic changes right greater than left  -Venous duplex ultrasound ordered in the ED  -Orthopedic surgery consulted  -MRI right hip showed no evidence of fracture or dislocation with no stress injury or reaction noted no evidence of osseous necrosis.  Mild to severe osteoarthritic degenerative changes of the bilateral hips are present right greater than left with a reactive joint effusion.  Evidence for a large paralabral cyst at the superior lateral margin of the right hip measuring 4.3 cm is present with evidence of chronic tendinopathy  involving the myotendinous attachments associated with the hips and pelvis bilaterally without evidence of open wounds to  -Robaxin started and will be prescribed at discharge    I discussed the patients findings and my recommendations with patient and nursing staff.     Discharge Diagnosis:      Right hip pain      Hospital Course  Patient is a 59 y.o. female presented with right hip pain with an HPI noted above.  WBCs were found within normal limits at 10.60 with a lactate of 0.7.  CRP was elevated at 6.09 with sed rate found to be 30.  D-dimer was also elevated at 2.28.  UA was obtained which showed trace ketones was otherwise unremarkable and blood cultures were obtained in the ED and are pending.  Urine drug screen was positive for oxycodone, amphetamine and methamphetamine.  X-ray of hip and pelvis showed arthritic changes in the hips right greater than left and MRI was ordered in the ED with no acute fracture, dislocation or stress injury noted with osteoarthritic changes as well as reactive joint effusion and a large paralabral cyst as noted above.  Orthopedic surgery recommended continuing with outpatient plans for procedure as previously scheduled.  Venous duplex ultrasound was also obtained and was negative.  Robaxin therapy was initiated and will be prescribed at discharge.  PT worked with patient who also recommended rolling walker which will be ordered at discharge.  At this time patient is felt to be in good condition for discharge with close follow-up with her PCP and orthopedic surgery on an outpatient basis.  Her full testing/results and plan were discussed with patient along with concerning/alarm symptoms for which to call 911/return to the ED.  All questions were answered and she verbalizes her understanding and agreement.    Past Medical History:     Past Medical History:   Diagnosis Date    Anxiety     Arthritis     Hypertension     Low back pain     Neuropathy     left foot    Pain in right  hip        Past Surgical History:     Past Surgical History:   Procedure Laterality Date    CARPAL TUNNEL RELEASE Bilateral     2/2023    TRIGGER FINGER RELEASE Bilateral     2/2023       Social History:   Social History     Socioeconomic History    Marital status: Single   Tobacco Use    Smoking status: Every Day     Current packs/day: 0.50     Average packs/day: 0.5 packs/day for 84.0 years (42.0 ttl pk-yrs)     Types: Cigarettes     Passive exposure: Current    Smokeless tobacco: Never   Vaping Use    Vaping status: Every Day    Substances: Nicotine, Flavoring   Substance and Sexual Activity    Alcohol use: Never    Drug use: Yes     Types: Amphetamines    Sexual activity: Defer       Procedures Performed         Consults:   Consults       Date and Time Order Name Status Description    1/25/2025  5:35 PM Ortho (on-call MD unless specified)              Condition on Discharge:     Stable    Discharge Disposition  Home or Self Care    Discharge Medications     Discharge Medications        New Medications        Instructions Start Date   methocarbamol 500 MG tablet  Commonly known as: ROBAXIN   500 mg, Oral, Every 6 Hours PRN             Continue These Medications        Instructions Start Date   amLODIPine 5 MG tablet  Commonly known as: NORVASC   5 mg, Daily      busPIRone 15 MG tablet  Commonly known as: BUSPAR   15 mg, 2 Times Daily      traZODone 100 MG tablet  Commonly known as: DESYREL   100 mg, Nightly               Discharge Diet:     Activity at Discharge:     Follow-up Appointments  No future appointments.  Additional Instructions for the Follow-ups that You Need to Schedule       Discharge Follow-up with PCP   As directed       Currently Documented PCP:    Provider, No Known    PCP Phone Number:    None     Follow Up Details: 5 to 7 days                Test Results Pending at Discharge  Pending Results       None             Risk for Readmission (LACE) Score: 1 (1/26/2025  6:00 AM)      Greater than 30  minutes spent in discharge activities for this patient    Signature:Electronically signed by Davidson Barr PA-C, 01/26/25, 11:51 AM EST.

## 2025-01-26 NOTE — SIGNIFICANT NOTE
01/26/25 1225   OTHER   Discipline occupational therapist   Rehab Time/Intention   Session Not Performed other (see comments)  (Hospital d/c pending this date.)   Recommendation   OT - Next Appointment 01/27/25

## 2025-01-26 NOTE — PLAN OF CARE
Goal Outcome Evaluation:  Plan of Care Reviewed With: patient           Outcome Evaluation: 60 y/o F who presented with severe R hip pain. Diagnosed with arthritic changes of bilateral hips R>L. also with  large para labral cyst at the superolateral margin of the right hip measuring up to 4.3 cm.Patient is independent and works as a , house sits and pet sits. Pt lives with her father whom she states she cares for but also states hes independent. He lives upstairs and she lives on first floor. She does not have RW. She is tearful upon entry about her pain and feeling like she cannot function until her hip replacement. Educated her to call her ortho surgeon tomorrow for guidance on MRI findings. Patient was able to mobilize mod I with RW. She is safe to d/c home from PT standpoint with RW.    Anticipated Discharge Disposition (PT): home with outpatient therapy services

## 2025-01-26 NOTE — CASE MANAGEMENT/SOCIAL WORK
Continued Stay Note  REINALDO Dial     Patient Name: Nanci Nunez  MRN: [id number]  Today's Date: 1/26/2025    Admit Date: 1/25/2025        Discharge Plan       Row Name 01/26/25 1312       Plan    Plan Comments Received order for rolling walker, delivered to pt room via [place] closet.                      Expected Discharge Date and Time       Expected Discharge Date Expected Discharge Time    Jan 26, 2025               Bárbara Samuel RN

## 2025-01-27 NOTE — CASE MANAGEMENT/SOCIAL WORK
"Case Management Discharge Note      Final Note: Routine Home. Noted therapy recommendations for outpatient rehab.  phoned patient at home. She states she isn't interested in rehab at this time,because she wants to first have surgery. She denied any needs other than \"need surgery\" at this time.       Transportation Services  Private: Car    Final Discharge Disposition Code: 01 - home or self-care  "

## 2025-01-30 LAB
BACTERIA SPEC AEROBE CULT: NORMAL
BACTERIA SPEC AEROBE CULT: NORMAL